# Patient Record
Sex: FEMALE | Race: ASIAN | URBAN - METROPOLITAN AREA
[De-identification: names, ages, dates, MRNs, and addresses within clinical notes are randomized per-mention and may not be internally consistent; named-entity substitution may affect disease eponyms.]

---

## 2017-01-11 ENCOUNTER — OFFICE VISIT (OUTPATIENT)
Dept: FAMILY MEDICINE CLINIC | Age: 22
End: 2017-01-11

## 2017-01-11 VITALS
HEART RATE: 91 BPM | OXYGEN SATURATION: 97 % | TEMPERATURE: 98.6 F | WEIGHT: 135.8 LBS | SYSTOLIC BLOOD PRESSURE: 102 MMHG | RESPIRATION RATE: 16 BRPM | DIASTOLIC BLOOD PRESSURE: 60 MMHG | HEIGHT: 63 IN | BODY MASS INDEX: 24.06 KG/M2

## 2017-01-11 DIAGNOSIS — R51.9 HEADACHE DISORDER: Primary | ICD-10-CM

## 2017-01-11 RX ORDER — BUTALBITAL, ACETAMINOPHEN AND CAFFEINE 50; 325; 40 MG/1; MG/1; MG/1
1 TABLET ORAL
Qty: 30 TAB | Refills: 1 | Status: SHIPPED | OUTPATIENT
Start: 2017-01-11 | End: 2021-04-23 | Stop reason: ALTCHOICE

## 2017-01-11 NOTE — PROGRESS NOTES
HISTORY OF PRESENT ILLNESS  Michaelle Closs is a 25 y.o. female. HPI   c/o intermittent HA's ; present X 1.5 years; helped by excedrin;  worsened by nothing;  Ha's are global in location; some associated nausea; No fever ; Has pain with movement of the head; may have some photosensitivity; some phonophobia; pain may be an 8/10 at its worse. Aleve may  help. Eye exam is UTD. 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 She has a Ha at least once a week;     Rib pain resolved; this was an original reason pt called for an appointment. PMH,  Meds, Allergies, Family History, Social history reviewed      Review of Systems   Constitutional: Negative for chills and fever. Cardiovascular: Negative for chest pain, palpitations and leg swelling. Physical Exam   Constitutional: She is oriented to person, place, and time. She appears well-developed and well-nourished. No distress. Cardiovascular: Normal rate and regular rhythm. Exam reveals no gallop and no friction rub. No murmur heard. Pulmonary/Chest: Breath sounds normal. No respiratory distress. She has no wheezes. She has no rales. Neurological: She is alert and oriented to person, place, and time. No cranial nerve deficit. Nursing note and vitals reviewed. ASSESSMENT and PLAN    ICD-10-CM ICD-9-CM    1. Headache disorder R51 784.0 butalbital-acetaminophen-caffeine (FIORICET, ESGIC) -40 mg per tablet     As above, new   treatment plan as listed below  Orders Placed This Encounter    butalbital-acetaminophen-caffeine (FIORICET, ESGIC) -40 mg per tablet     Will monitor,  Alarm signs for worsening sx reviewed  Follow-up Disposition:  Return in about 2 months (around 3/11/2017) for HA. An After Visit Summary was printed and given to the patient. This has been fully explained to the patient, who indicates understanding.

## 2017-01-11 NOTE — PATIENT INSTRUCTIONS

## 2017-01-11 NOTE — MR AVS SNAPSHOT
Visit Information Date & Time Provider Department Dept. Phone Encounter #  
 1/11/2017 11:00 AM [facility] [phone] [id number] Follow-up Instructions Return in about 2 months (around 3/11/2017) for HA. Upcoming Health Maintenance Date Due Hepatitis A Peds Age 1-18 (1 of 2 - Standard Series) 2/3/1998 HPV AGE 9Y-26Y (1 of 3 - Female 3 Dose Series) 2/3/2008 INFLUENZA AGE 9 TO ADULT 8/1/2016 DTaP/Tdap/Td series (7 - Td) 3/25/2018 Allergies as of 1/11/2017  Review Complete On: 1/11/2017 By: Amber Walton LPN No Known Allergies Current Immunizations  Reviewed on 7/20/2015 Name Date DTaP 4/19/2001, 2/2/1998, 1997, 1997, 1997 Hep B Vaccine 1997, 1997, 1997 Hep B Vaccine (Adult) 7/17/2015 Hib 11/13/1998, 2/2/1998, 1997, 1997, 1997 MMR 4/19/2001, 2/9/1998 Meningococcal (MCV4P) Vaccine 6/26/2015 Poliovirus vaccine 2/2/1998, 1997, 1997 TB Skin Test (PPD) Intradermal 7/29/2016 11:15 AM, 7/17/2015  3:22 PM, 7/8/2015 11:41 AM, 6/26/2015 10:55 AM, 9/29/2014 Tdap 3/25/2008 Varicella Virus Vaccine 7/17/2015, 3/30/2009 Not reviewed this visit You Were Diagnosed With   
  
 Codes Comments Headache disorder    -  Primary ICD-10-CM: R46 ICD-9-CM: 029. 0 Vitals BP Pulse Temp Resp Height(growth percentile) Weight(growth percentile) 102/60 (30 %/ 43 %)* (BP 1 Location: Left arm, BP Patient Position: Sitting) 91 98.6 °F (37 °C) (Oral) 16 5' 3\" (1.6 m) (30 %, Z= -0.51) 135 lb 12.8 oz (61.6 kg) (63 %, Z= 0.33) LMP SpO2 BMI OB Status Smoking Status 01/01/2017 (Exact Date) 97% 24.06 kg/m2 (73 %, Z= 0.60) Having regular periods Never Smoker *BP percentiles are based on NHBPEP's 4th Report Growth percentiles are based on CDC 2-20 Years data. BMI and BSA Data Body Mass Index Body Surface Area 24.06 kg/m 2 1.65 m 2 Preferred Pharmacy Pharmacy Name Phone [address], [address] [facility] [phone] Your Updated Medication List  
  
   
This list is accurate as of: 1/11/17 11:51 AM.  Always use your most recent med list.  
  
  
  
  
 butalbital-acetaminophen-caffeine -40 mg per tablet Commonly known as:  Violet Rosalba Take 1 Tab by mouth every six (6) hours as needed for Pain. Max Daily Amount: 4 Tabs. fluticasone 50 mcg/actuation nasal spray Commonly known as:  FLONASE  
1 [place] by Both Nostrils route two (2) times a day. Prescriptions Printed Refills  
 butalbital-acetaminophen-caffeine (FIORICET, ESGIC) -40 mg per tablet 1 Sig: Take 1 Tab by mouth every six (6) hours as needed for Pain. Max Daily Amount: 4 Tabs. Class: Print Route: Oral  
  
Follow-up Instructions Return in about 2 months (around 3/11/2017) for HA. Patient Instructions Headache: Care Instructions Your Care Instructions Headaches have many possible causes. Most headaches aren't a sign of a more serious problem, and they will get better on their own. Home treatment may help you feel better faster. The doctor has checked you carefully, but problems can develop later. If you notice any problems or new symptoms, get medical treatment right away. Follow-up care is a key part of your treatment and safety. Be sure to make and go to all appointments, and call your doctor if you are having problems. It's also a good idea to know your test results and keep a list of the medicines you take. How can you care for yourself at home? · Do not drive if you have taken a prescription pain medicine. · Rest in a quiet, dark room until your headache is gone. Close your eyes and try to relax or go to sleep. Don't watch TV or read. · Put a cold, moist cloth or cold pack on the painful area for 10 to 20 minutes at a time. Put a thin cloth between the cold pack and your skin. · Use a warm, moist towel or a heating pad set on low to relax tight shoulder and neck muscles. · Have someone gently massage your neck and shoulders. · Take pain medicines exactly as directed. ¨ If the doctor gave you a prescription medicine for pain, take it as prescribed. ¨ If you are not taking a prescription pain medicine, ask your doctor if you can take an over-the-counter medicine. · Be careful not to take pain medicine more often than the instructions allow, because you may get worse or more frequent headaches when the medicine wears off. · Do not ignore new symptoms that occur with a headache, such as a fever, weakness or numbness, vision changes, or confusion. These may be signs of a more serious problem. To prevent headaches · Keep a headache diary so you can figure out what triggers your headaches. Avoiding triggers may help you prevent headaches. Record when each headache began, how long it lasted, and what the pain was like (throbbing, aching, stabbing, or dull). Write down any other symptoms you had with the headache, such as nausea, flashing lights or dark spots, or sensitivity to bright light or loud noise. Note if the headache occurred near your period. List anything that might have triggered the headache, such as certain foods (chocolate, cheese, wine) or odors, smoke, bright light, stress, or lack of sleep. · Find healthy ways to deal with stress. Headaches are most common during or right after stressful times. Take time to relax before and after you do something that has caused a headache in the past. 
· Try to keep your muscles relaxed by keeping good posture. Check your jaw, face, neck, and shoulder muscles for tension, and try relaxing them. When sitting at a desk, change positions often, and stretch for 30 seconds each hour. · Get plenty of sleep and exercise. · Eat regularly and well. Long periods without food can trigger a headache. · Treat yourself to a massage. Some people find that regular massages are very helpful in relieving tension. · Limit caffeine by not drinking too much coffee, tea, or soda. But don't quit caffeine suddenly, because that can also give you headaches. · Reduce eyestrain from computers by blinking frequently and looking away from the computer screen every so often. Make sure you have proper eyewear and that your monitor is set up properly, about an arm's length away. · Seek help if you have depression or anxiety. Your headaches may be linked to these conditions. Treatment can both prevent headaches and help with symptoms of anxiety or depression. When should you call for help? Call 911 anytime you think you may need emergency care. For example, call if: 
· You have signs of a stroke. These may include: 
¨ Sudden numbness, paralysis, or weakness in your face, arm, or leg, especially on only one side of your body. ¨ Sudden vision changes. ¨ Sudden trouble speaking. ¨ Sudden confusion or trouble understanding simple statements. ¨ Sudden problems with walking or balance. ¨ A sudden, severe headache that is different from past headaches. Call your doctor now or seek immediate medical care if: 
· You have a new or worse headache. · Your headache gets much worse. Where can you learn more? Go to http://indra-mario.info/. Enter M271 in the search box to learn more about \"Headache: Care Instructions. \" Current as of: February 19, 2016 Content Version: 11.1 © 4602-1001 Mom-stop.com. Care instructions adapted under license by [employer] (which disclaims liability or warranty for this information).  If you have questions about a medical condition or this instruction, always ask your healthcare professional. Keily Nagel[employer] disclaims any warranty or liability for your use of this information. Introducing [facility]! [facility] introduces "[employer] patient portal. Now you can access parts of your medical record, email your doctor's office, and request medication refills online. 1. In your internet browser, go to [email or web address] 2. Click on the First Time User? Click Here link in the Sign In box. You will see the New Member Sign Up page. 3. Enter your "[employer] Access Code exactly as it appears below. You will not need to use this code after youve completed the sign-up process. If you do not sign up before the expiration date, you must request a new code. · "[employer] Access Code: [email or web address] Expires: 4/11/2017 11:51 AM 
 
4. Enter the last four digits of your Social Security Number (xxxx) and Date of Birth (mm/dd/yyyy) as indicated and click Submit. You will be taken to the next sign-up page. 5. Create a "[employer] ID. This will be your "[employer] login ID and cannot be changed, so think of one that is secure and easy to remember. 6. Create a "[employer] password. You can change your password at any time. 7. Enter your Password Reset Question and Answer. This can be used at a later time if you forget your password. 8. Enter your e-mail address. You will receive e-mail notification when new information is available in [address] 9. Click Sign Up. You can now view and download portions of your medical record. 10. Click the Download Summary menu link to download a portable copy of your medical information. If you have questions, please visit the Frequently Asked Questions section of the "[employer] website. Remember, "[employer] is NOT to be used for urgent needs. For medical emergencies, dial 911. Now available from your iPhone and Android! Please provide this summary of care documentation to your next provider. Your primary care clinician is listed as Mata Contreras. If you have any questions after today's visit, please call [phone].

## 2017-01-11 NOTE — PROGRESS NOTES
1. Have you been to the ER, urgent care clinic since your last visit? Hospitalized since your last visit? NO    2. Have you seen or consulted any other health care providers outside of the [address] [place] [address] since your last visit? Include any pap smears or colon screening. NO    3. Would you like to have a Flu Shot Today?  NO already had one

## 2017-06-08 ENCOUNTER — OFFICE VISIT (OUTPATIENT)
Dept: FAMILY MEDICINE CLINIC | Age: 22
End: 2017-06-08

## 2017-06-08 VITALS
TEMPERATURE: 98.6 F | HEART RATE: 76 BPM | RESPIRATION RATE: 16 BRPM | WEIGHT: 128.6 LBS | HEIGHT: 63 IN | DIASTOLIC BLOOD PRESSURE: 70 MMHG | SYSTOLIC BLOOD PRESSURE: 100 MMHG | BODY MASS INDEX: 22.79 KG/M2 | OXYGEN SATURATION: 97 %

## 2017-06-08 NOTE — PROGRESS NOTES
Patient reports that she cannot void. Wishes to return in the morning for early appointment.    Abida SRUESH

## 2017-06-08 NOTE — PROGRESS NOTES
1. Have you been to the ER, urgent care clinic since your last visit? Hospitalized since your last visit? No    2. Have you seen or consulted any other health care providers outside of the [facility] since your last visit? Include any pap smears or colon screening.  No

## 2017-06-09 ENCOUNTER — OFFICE VISIT (OUTPATIENT)
Dept: FAMILY MEDICINE CLINIC | Age: 22
End: 2017-06-09

## 2017-06-09 VITALS
TEMPERATURE: 98.7 F | SYSTOLIC BLOOD PRESSURE: 88 MMHG | OXYGEN SATURATION: 97 % | DIASTOLIC BLOOD PRESSURE: 64 MMHG | WEIGHT: 128 LBS | BODY MASS INDEX: 22.68 KG/M2 | HEIGHT: 63 IN | HEART RATE: 102 BPM | RESPIRATION RATE: 16 BRPM

## 2017-06-09 DIAGNOSIS — N92.6 IRREGULAR MENSES: Primary | ICD-10-CM

## 2017-06-09 DIAGNOSIS — I95.0 IDIOPATHIC HYPOTENSION: ICD-10-CM

## 2017-06-09 LAB
HCG URINE, QL. (POC): NEGATIVE
VALID INTERNAL CONTROL?: YES

## 2017-06-09 RX ORDER — NORGESTIMATE AND ETHINYL ESTRADIOL 7DAYSX3 LO
1 KIT ORAL DAILY
Qty: 3 PACKAGE | Refills: 4 | Status: SHIPPED | OUTPATIENT
Start: 2017-06-09 | End: 2018-07-01 | Stop reason: SDUPTHER

## 2017-06-09 NOTE — PROGRESS NOTES
HISTORY OF PRESENT ILLNESS  Ewelina Nguyen is a 23 y.o. female. Pt presents with a history of irregular periods and headaches. She is not sure her headaches are associated with her cycles. Her cycles last about 6 days. Irregular Menses   The history is provided by the patient. Pertinent negatives include no chest pain, no headaches and no shortness of breath. Review of Systems   Constitutional: Negative for chills and fever. Eyes: Negative. Respiratory: Negative. Negative for shortness of breath. Cardiovascular: Negative. Negative for chest pain. Neurological: Negative for headaches. Physical Exam   Constitutional: She appears well-developed and well-nourished. No distress. Neck: Neck supple. Cardiovascular: Normal rate, regular rhythm and normal heart sounds. No murmur heard. Pulmonary/Chest: Effort normal and breath sounds normal. No respiratory distress. She has no wheezes. She has no rales. ASSESSMENT and PLAN    ICD-10-CM ICD-9-CM    1. Irregular menses N92.6 626.4 AMB POC URINE PREGNANCY TEST, VISUAL COLOR COMPARISON      norgestimate-ethinyl estradiol (ORTHO TRI-CYCLEN LO, 28,) 0.18/0.215/0.25 mg-25 mcg tab   2. Idiopathic hypotension I95.0 458.9        PLAN:  We discussed her headaches. She will try to track them. We discussed birth control, how to take it correctly and when to start her pack. Pt will call if she gets to the pharm and they cost too much, we can switch. Pt advised that next year she will be due for a PAP. Pt is to call with any concerns. We discussed her low blood pressure, symptoms and I advised her to drink plenty of fluids and salt intake.

## 2017-06-09 NOTE — MR AVS SNAPSHOT
Visit Information Date & Time Provider Department Dept. Phone Encounter #  
 6/9/2017  8:00 AM Rosamaria Meza NP [facility] [phone] [id number] Follow-up Instructions Return in about 1 year (around 6/9/2018). Upcoming Health Maintenance Date Due Hepatitis A Peds Age 1-18 (1 of 2 - Standard Series) 2/3/1998 HPV AGE 9Y-26Y (1 of 3 - Female 3 Dose Series) 2/3/2008 INFLUENZA AGE 9 TO ADULT 8/1/2017 DTaP/Tdap/Td series (7 - Td) 3/25/2018 Allergies as of 6/9/2017  Review Complete On: 6/9/2017 By: Rosamaria Meza NP No Known Allergies Current Immunizations  Reviewed on 7/20/2015 Name Date DTaP 4/19/2001, 2/2/1998, 1997, 1997, 1997 Hep B Vaccine 1997, 1997, 1997 Hep B Vaccine (Adult) 7/17/2015 Hib 11/13/1998, 2/2/1998, 1997, 1997, 1997 MMR 4/19/2001, 2/9/1998 Meningococcal (MCV4P) Vaccine 6/26/2015 Poliovirus vaccine 2/2/1998, 1997, 1997 TB Skin Test (PPD) Intradermal 7/29/2016 11:15 AM, 7/17/2015  3:22 PM, 7/8/2015 11:41 AM, 6/26/2015 10:55 AM, 9/29/2014 Tdap 3/25/2008 Varicella Virus Vaccine 7/17/2015, 3/30/2009 Not reviewed this visit You Were Diagnosed With   
  
 Codes Comments Irregular menses    -  Primary ICD-10-CM: N92.6 ICD-9-CM: 626.4 Vitals BP Pulse Temp Resp Height(growth percentile) Weight(growth percentile) (!) 88/64 (BP 1 Location: Left arm, BP Patient Position: Sitting) (!) 102 98.7 °F (37.1 °C) (Oral) 16 5' 3\" (1.6 m) 128 lb (58.1 kg) LMP SpO2 BMI OB Status Smoking Status 06/09/2017 97% 22.67 kg/m2 Unknown Never Smoker BMI and BSA Data Body Mass Index Body Surface Area  
 22.67 kg/m 2 1.61 m 2 Preferred Pharmacy Pharmacy Name Phone [address], Igor Naidu [address] [place] [address] 10[address] [facility] [phone] Your Updated Medication List  
  
   
 This list is accurate as of: 6/9/17  8:27 AM.  Always use your most recent med list.  
  
  
  
  
 butalbital-acetaminophen-caffeine -40 mg per tablet Commonly known as:  Gonsalo Donohue Take 1 Tab by mouth every six (6) hours as needed for Pain. Max Daily Amount: 4 Tabs. fluticasone 50 mcg/actuation nasal spray Commonly known as:  FLONASE  
1 [place] by Both Nostrils route two (2) times a day. norgestimate-ethinyl estradiol 0.18/0.215/0.25 mg-25 mcg Tab Commonly known as:  ORTHO TRI-CYCLEN LO (28) Take 1 Tab by mouth daily. Prescriptions Sent to Pharmacy Refills  
 norgestimate-ethinyl estradiol (ORTHO TRI-CYCLEN LO, 28,) 0.18/0.215/0.25 mg-25 mcg tab 4 Sig: Take 1 Tab by mouth daily. Class: Normal  
 Pharmacy: [address] [place] [address] #: [phone] Route: Oral  
  
We Performed the Following AMB POC URINE PREGNANCY TEST, VISUAL COLOR COMPARISON [65421 CPT(R)] Follow-up Instructions Return in about 1 year (around 6/9/2018). Patient Instructions Learning About Birth Control: Combination Pills What are combination pills? Combination pills are used to prevent pregnancy. Most people call them \"the pill. \" Combination pills release a regular dose of two hormones, estrogen and progestin. They prevent pregnancy in a few ways. They thicken the mucus in the cervix. This makes it hard for sperm to travel into the uterus. And they thin the lining of the uterus. This makes it harder for a fertilized egg to attach to the uterus. The hormones also can stop the ovaries from releasing an egg each month (ovulation). You have to take a pill every day to prevent pregnancy. The packages for these pills are different. The most common one has 3 weeks of hormone pills and 1 week of sugar pills.  The sugar pills don't contain any hormones. You have your period on that week. But other packs have no sugar pills. If you take hormone pills for the whole month, you will not get your period as often. Or you may not get it at all. How well do they work? In the first year of use: · When combination pills are taken exactly as directed, fewer than 1 woman out of 100 has an unplanned pregnancy. · When pills are not taken exactly as directed, such as forgetting to take them sometimes, 9 women out of 100 have an unplanned pregnancy. Be sure to tell your doctor about any health problems you have or medicines you take. He or she can help you choose the birth control method that is right for you. What are the advantages of combination pills? · These pills work better than barrier methods. Barrier methods include condoms and diaphragms. · They may reduce acne and heavy bleeding. They may also reduce cramping and other symptoms of PMS (premenstrual syndrome). · The pills let you control your periods. You can have periods every month or every few months. Or you can choose not to have them at all. · You don't have to interrupt sex to use the pills. What are the disadvantages of combination pills? · You have to take a pill at the same time every day to prevent pregnancy. · Combination pills don't protect against sexually transmitted infections (STIs), such as herpes or HIV/AIDS. If you aren't sure if your sex partner might have an STI, use a condom to protect against disease. · They may cause changes in your period. You may have little bleeding, skipped periods, or spotting. · They may cause mood changes, less interest in sex, or weight gain. · Combination pills contain estrogen. They may not be right for you if you have certain health problems. Where can you learn more? Go to http://indra-mario.info/. Enter P146 in the search box to learn more about \"Learning About Birth Control: Combination Pills. \" 
 Current as of: May 30, 2016 Content Version: 11.2 © 5976-7789 [employer], [employer]. Care instructions adapted under license by [employer] (which disclaims liability or warranty for this information). If you have questions about a medical condition or this instruction, always ask your healthcare professional. [address] any warranty or liability for your use of this information. Introducing [facility]! Thi Pedraza introduces The [employer] patient portal. Now you can access parts of your medical record, email your doctor's office, and request medication refills online. 1. In your internet browser, go to [email or web address] [email or web address] 2. Click on the First Time User? Click Here link in the Sign In box. You will see the New Member Sign Up page. 3. Enter your The Start Project Access Code exactly as it appears below. You will not need to use this code after youve completed the sign-up process. If you do not sign up before the expiration date, you must request a new code. · [employer] Access Code: [email or web address] Expires: 9/6/2017  9:03 AM 
 
4. Enter the last four digits of your Social Security Number (xxxx) and Date of Birth (mm/dd/yyyy) as indicated and click Submit. You will be taken to the next sign-up page. 5. Create a The [employer] Project ID. This will be your [employer] login ID and cannot be changed, so think of one that is secure and easy to remember. 6. Create a [employer] password. You can change your password at any time. 7. Enter your Password Reset Question and Answer. This can be used at a later time if you forget your password. 8. Enter your e-mail address. You will receive e-mail notification when new information is available in [address] 9. Click Sign Up. You can now view and download portions of your medical record. 10. Click the Download Summary menu link to download a portable copy of your medical information. If you have questions, please visit the Frequently Asked Questions section of the [employer] website. Remember, [employer] is NOT to be used for urgent needs. For medical emergencies, dial 911. Now available from your iPhone and Android! Please provide this summary of care documentation to your next provider. Your primary care clinician is listed as Nicole Ac. If you have any questions after today's visit, please call [phone].

## 2017-06-09 NOTE — PATIENT INSTRUCTIONS
Learning About Birth Control: Combination Pills  What are combination pills? Combination pills are used to prevent pregnancy. Most people call them \"the pill. \"  Combination pills release a regular dose of two hormones, estrogen and progestin. They prevent pregnancy in a few ways. They thicken the mucus in the cervix. This makes it hard for sperm to travel into the uterus. And they thin the lining of the uterus. This makes it harder for a fertilized egg to attach to the uterus. The hormones also can stop the ovaries from releasing an egg each month (ovulation). You have to take a pill every day to prevent pregnancy. The packages for these pills are different. The most common one has 3 weeks of hormone pills and 1 week of sugar pills. The sugar pills don't contain any hormones. You have your period on that week. But other packs have no sugar pills. If you take hormone pills for the whole month, you will not get your period as often. Or you may not get it at all. How well do they work? In the first year of use:  · When combination pills are taken exactly as directed, fewer than 1 woman out of 100 has an unplanned pregnancy. · When pills are not taken exactly as directed, such as forgetting to take them sometimes, 9 women out of 100 have an unplanned pregnancy. Be sure to tell your doctor about any health problems you have or medicines you take. He or she can help you choose the birth control method that is right for you. What are the advantages of combination pills? · These pills work better than barrier methods. Barrier methods include condoms and diaphragms. · They may reduce acne and heavy bleeding. They may also reduce cramping and other symptoms of PMS (premenstrual syndrome). · The pills let you control your periods. You can have periods every month or every few months. Or you can choose not to have them at all. · You don't have to interrupt sex to use the pills.   What are the disadvantages of combination pills? · You have to take a pill at the same time every day to prevent pregnancy. · Combination pills don't protect against sexually transmitted infections (STIs), such as herpes or HIV/AIDS. If you aren't sure if your sex partner might have an STI, use a condom to protect against disease. · They may cause changes in your period. You may have little bleeding, skipped periods, or spotting. · They may cause mood changes, less interest in sex, or weight gain. · Combination pills contain estrogen. They may not be right for you if you have certain health problems. Where can you learn more? Go to http://indra-mario.info/. Enter X149 in the search box to learn more about \"Learning About Birth Control: Combination Pills. \"  Current as of: May 30, 2016  Content Version: 11.2  © 8593-6162 [employer]. Care instructions adapted under license by [employer] (which disclaims liability or warranty for this information). If you have questions about a medical condition or this instruction, always ask your healthcare professional. [address] any warranty or liability for your use of this information.

## 2017-06-09 NOTE — PROGRESS NOTES
1. Have you been to the ER, urgent care clinic since your last visit? Hospitalized since your last visit? No    2. Have you seen or consulted any other health care providers outside of the [facility] [place] since your last visit? Include any pap smears or colon screening.  No

## 2017-08-15 ENCOUNTER — CLINICAL SUPPORT (OUTPATIENT)
Dept: FAMILY MEDICINE CLINIC | Age: 22
End: 2017-08-15

## 2017-08-15 DIAGNOSIS — Z11.1 SCREENING EXAMINATION FOR PULMONARY TUBERCULOSIS: ICD-10-CM

## 2017-08-15 DIAGNOSIS — Z23 ENCOUNTER FOR IMMUNIZATION: ICD-10-CM

## 2017-08-15 RX ORDER — NORGESTIMATE AND ETHINYL ESTRADIOL 7DAYSX3 LO
KIT ORAL
COMMUNITY
End: 2017-10-20 | Stop reason: SDUPTHER

## 2017-08-15 NOTE — PROGRESS NOTES
PPD Placement note  Severino aKur, 23 y.o. 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 Yes  Is patient taking any oral or IV steroid medication now or have they taken it in the last month? no  Has the patient ever received the BCG vaccine?: no  Has the patient been in recent contact with anyone known or suspected of having active TB disease?: no       Date of exposure (if applicable): n/a       Name of person they were exposed to (if applicable): n/a  Patient's Country of origin?: [place]  O: Alert and oriented in NAD. P:  Patient presented to office for PPD placement. Reviewed allergies. Patient states has never had a positive PPD in the past. PPD injection at right forearm time of placement 2:32 PM.  Patient advised to return 48-72hrs for reading. Patient tolerated injection well and left ambulatory without any complaints. Patient verbalizes understanding.

## 2017-08-15 NOTE — PATIENT INSTRUCTIONS
Tuberculin Skin Test: Care Instructions  Your Care Instructions    Tuberculosis (TB) is a bacterial infection that can damage the lungs or other parts of the body. The TB skin test can tell if you have TB bacteria in your body. Many people are exposed to TB and test positive for TB bacteria in their bodies, but they don't get the disease. TB bacteria can stay in your body without making you sick. This is because your immune system can keep TB in check. Your doctor may want you to have a TB skin test if you have been in close contact with someone who has TB. Or you may need the test if you have symptoms that might be caused by TB, such as a cough that does not go away, a fever, or weight loss. You also may get the test if you are a health care worker. During the skin test, part of a TB bacterium is injected under your skin. The test will feel like a skin prick. If you have TB bacteria in your body, a firm red bump will form at the shot site within 2 days. If the test shows that you are infected with TB (positive), your doctor probably will order more tests. A TB-positive skin test can't tell when you became infected with TB. And it can't tell whether the infection can be passed to others. Follow-up care is a key part of your treatment and safety. Be sure to make and go to all appointments, and call your doctor if you are having problems. It's also a good idea to know your test results and keep a list of the medicines you take. How can you care for yourself at home? · Do not scratch the test site. Scratching it may cause redness or swelling. This could affect the test results. · To ease itching, put a cold washcloth on the site. Then pat the site dry. · Do not cover the test site with a bandage or other dressing. · Go back to your doctor's office or hospital to have the test read on the follow-up date. This must be done between 48 and 72 hours after you get the shot. When should you call for help?   Watch closely for changes in your health, and be sure to contact your doctor if:  · You did not have your TB skin test checked by your doctor. · You have a fever or swelling in your arm. · You do not get better as expected. Where can you learn more? Go to http://indra-mario.info/. Enter [phone] in the search box to learn more about \"Tuberculin Skin Test: Care Instructions. \"  Current as of: March 3, 2017  Content Version: 11.3  © 4229-8852 [employer]. Care instructions adapted under license by [employer] (which disclaims liability or warranty for this information). If you have questions about a medical condition or this instruction, always ask your healthcare professional. [address] any warranty or liability for your use of this information. placement 2:32 PM.  Patient advised to return 48-72hrs for reading. Patient must have PPD read no earlier than 2:32 PM on Thursday 08/17/2017 and no later than 2:32 PM on Friday 08/18/2017.

## 2017-08-15 NOTE — MR AVS SNAPSHOT
Visit Information Date & Time Provider Department Dept. Phone Encounter #  
 8/15/2017  2:05 PM Erica Lira  [facility], [place], operated [facility] [phone] Upcoming Health Maintenance Date Due Hepatitis A Peds Age 1-18 (1 of 2 - Standard Series) 2/3/1998 HPV AGE 9Y-26Y (1 of 3 - Female 3 Dose Series) 2/3/2008 INFLUENZA AGE 9 TO ADULT 8/1/2017 DTaP/Tdap/Td series (7 - Td) 3/25/2018 Allergies as of 8/15/2017  Review Complete On: 8/15/2017 By: Bj Spencer LPN No Known Allergies Current Immunizations  Reviewed on 7/20/2015 Name Date DTaP 4/19/2001, 2/2/1998, 1997, 1997, 1997 Hep B Vaccine 1997, 1997, 1997 Hep B Vaccine (Adult) 7/17/2015 Hib 11/13/1998, 2/2/1998, 1997, 1997, 1997 MMR 4/19/2001, 2/9/1998 Meningococcal (MCV4P) Vaccine 6/26/2015 Poliovirus vaccine 2/2/1998, 1997, 1997 TB Skin Test (PPD) Intradermal  Incomplete, 7/29/2016 11:15 AM, 7/17/2015  3:22 PM, 7/8/2015 11:41 AM, 6/26/2015 10:55 AM, 9/29/2014 Tdap 3/25/2008 Varicella Virus Vaccine 7/17/2015, 3/30/2009 Not reviewed this visit You Were Diagnosed With   
  
 Codes Comments Encounter for immunization     ICD-10-CM: Y81 ICD-9-CM: V03.89 Screening examination for pulmonary tuberculosis     ICD-10-CM: Z11.1 ICD-9-CM: V74.1 Vitals OB Status Smoking Status Unknown Never Smoker Preferred Pharmacy Pharmacy Name Phone [address], [address] [facility] [address] [facility] [phone] Your Updated Medication List  
  
   
This list is accurate as of: 8/15/17  2:34 PM.  Always use your most recent med list.  
  
  
  
  
 butalbital-acetaminophen-caffeine -40 mg per tablet Commonly known as:  Elizabeth Rolon Take 1 Tab by mouth every six (6) hours as needed for Pain. Max Daily Amount: 4 Tabs. fluticasone 50 mcg/actuation nasal spray Commonly known as:  FLONASE  
1 [place]ond by Both Nostrils route two (2) times a day. * TRINESSA LO 0.18/0.215/0.25 mg-25 mcg Tab Generic drug:  norgestimate-ethinyl estradiol Take  by mouth. * norgestimate-ethinyl estradiol 0.18/0.215/0.25 mg-25 mcg Tab Commonly known as:  ORTHO TRI-CYCLEN LO (28) Take 1 Tab by mouth daily. * Notice: This list has 2 medication(s) that are the same as other medications prescribed for you. Read the directions carefully, and ask your doctor or other care provider to review them with you. We Performed the Following AMB POC TUBERCULOSIS, INTRADERMAL (SKIN TEST) [99764 CPT(R)] Patient Instructions Tuberculin Skin Test: Care Instructions Your Care Instructions Tuberculosis (TB) is a bacterial infection that can damage the lungs or other parts of the body. The TB skin test can tell if you have TB bacteria in your body. Many people are exposed to TB and test positive for TB bacteria in their bodies, but they don't get the disease. TB bacteria can stay in your body without making you sick. This is because your immune system can keep TB in check. Your doctor may want you to have a TB skin test if you have been in close contact with someone who has TB. Or you may need the test if you have symptoms that might be caused by TB, such as a cough that does not go away, a fever, or weight loss. You also may get the test if you are a health care worker. During the skin test, part of a TB bacterium is injected under your skin. The test will feel like a skin prick. If you have TB bacteria in your body, a firm red bump will form at the shot site within 2 days. If the test shows that you are infected with TB (positive), your doctor probably will order more tests. A TB-positive skin test can't tell when you became infected with TB. And it can't tell whether the infection can be passed to others. Follow-up care is a key part of your treatment and safety. Be sure to make and go to all appointments, and call your doctor if you are having problems. It's also a good idea to know your test results and keep a list of the medicines you take. How can you care for yourself at home? · Do not scratch the test site. Scratching it may cause redness or swelling. This could affect the test results. · To ease itching, put a cold washcloth on the site. Then pat the site dry. · Do not cover the test site with a bandage or other dressing. · Go back to your doctor's office or hospital to have the test read on the follow-up date. This must be done between 48 and 72 hours after you get the shot. When should you call for help? Watch closely for changes in your health, and be sure to contact your doctor if: 
· You did not have your TB skin test checked by your doctor. · You have a fever or swelling in your arm. · You do not get better as expected. Where can you learn more? Go to http://indra-mario.info/. Enter [phone] in the search box to learn more about \"Tuberculin Skin Test: Care Instructions. \" Current as of: March 3, 2017 Content Version: 11.3 © 7628-8473 [employer]. Care instructions adapted under license by [employer] (which disclaims liability or warranty for this information). If you have questions about a medical condition or this instruction, always ask your healthcare professional. [place] [address] any warranty or liability for your use of this information. placement 2:32 PM.  Patient advised to return 48-72hrs for reading. Patient must have PPD read no earlier than 2:32 PM on Thursday 08/17/2017 and no later than 2:32 PM on Friday 08/18/2017. Introducing [address]! [employer] introduces [employer] patient portal. Now you can access parts of your medical record, email your doctor's office, and request medication refills online. 1. In your internet browser, go to [email or web address] 2. Click on the First Time User? Click Here link in the Sign In box. You will see the New Member Sign Up page. 3. Enter your [employer] Access Code exactly as it appears below. You will not need to use this code after youve completed the sign-up process. If you do not sign up before the expiration date, you must request a new code. · [employer] Access Code: [email or web address] Expires: 9/6/2017  9:03 AM 
 
4. Enter the last four digits of your Social Security Number (xxxx) and Date of Birth (mm/dd/yyyy) as indicated and click Submit. You will be taken to the next sign-up page. 5. Create a Snapbridge Softwaret ID. This will be your [employer] login ID and cannot be changed, so think of one that is secure and easy to remember. 6. Create a [employer] password. You can change your password at any time. 7. Enter your Password Reset Question and Answer. This can be used at a later time if you forget your password. 8. Enter your e-mail address. You will receive e-mail notification when new information is available in [address] 9. Click Sign Up. You can now view and download portions of your medical record. 10. Click the Download Summary menu link to download a portable copy of your medical information. If you have questions, please visit the Frequently Asked Questions section of the [employer] website. Remember, [employer] is NOT to be used for urgent needs. For medical emergencies, dial 911. Now available from your iPhone and Android! Please provide this summary of care documentation to your next provider. Your primary care clinician is listed as Mik Kwong. If you have any questions after today's visit, please call [phone].

## 2017-08-17 ENCOUNTER — CLINICAL SUPPORT (OUTPATIENT)
Dept: FAMILY MEDICINE CLINIC | Age: 22
End: 2017-08-17

## 2017-08-17 DIAGNOSIS — Z11.1 PPD SCREENING TEST: Primary | ICD-10-CM

## 2017-08-17 LAB
MM INDURATION POC: 0 MM (ref 0–5)
PPD POC: NEGATIVE NEGATIVE

## 2017-08-17 NOTE — LETTER
8/17/2017 2:52 PM 
 
Ms. [address] [place] [address] To Whom This May Concern: Ms. Vinnie Ribera came into the office on 8/15/2017 for PPD placement. She returned today for reading. PPD read as negative 0 mm induration. If you have any questions or concerns, please have the patient contact the office. Sincerely, Tova Silverman LPN

## 2017-08-17 NOTE — PROGRESS NOTES
Patient returned to office today for PPD reading. PPD read as negative 0 mm. No adverse effects noted. Patient left ambulatory with no complaints of pain or distress noted at this time.     Results for orders placed or performed in visit on 08/15/17   AMB POC TUBERCULOSIS, INTRADERMAL (SKIN TEST)   Result Value Ref Range    PPD negative Negative    mm Induration 0 mm

## 2017-10-19 ENCOUNTER — OFFICE VISIT (OUTPATIENT)
Dept: FAMILY MEDICINE CLINIC | Age: 22
End: 2017-10-19

## 2017-10-19 VITALS
SYSTOLIC BLOOD PRESSURE: 96 MMHG | TEMPERATURE: 97.8 F | HEART RATE: 78 BPM | DIASTOLIC BLOOD PRESSURE: 67 MMHG | WEIGHT: 123 LBS | BODY MASS INDEX: 21.79 KG/M2 | HEIGHT: 63 IN | RESPIRATION RATE: 18 BRPM

## 2017-10-19 DIAGNOSIS — J30.9 ALLERGIC RHINITIS, UNSPECIFIED CHRONICITY, UNSPECIFIED SEASONALITY, UNSPECIFIED TRIGGER: Primary | ICD-10-CM

## 2017-10-19 DIAGNOSIS — Z23 ENCOUNTER FOR IMMUNIZATION: ICD-10-CM

## 2017-10-19 DIAGNOSIS — K62.5 BRIGHT RED RECTAL BLEEDING: ICD-10-CM

## 2017-10-19 RX ORDER — BENZOCAINE .13; .15; .5; 2 G/100G; G/100G; G/100G; G/100G
2 GEL ORAL
Qty: 1 BOTTLE | Refills: 2 | Status: SHIPPED | OUTPATIENT
Start: 2017-10-19 | End: 2017-12-19 | Stop reason: SDUPTHER

## 2017-10-19 NOTE — MR AVS SNAPSHOT
Visit Information Date & Time Provider Department Dept. Phone Encounter #  
 10/19/2017  3:20 PM Jamal Fernández, [address] [facility] [address] [address] [phone] [id number] Follow-up Instructions Return if symptoms worsen or fail to improve. Upcoming Health Maintenance Date Due Hepatitis A Peds Age 1-18 (1 of 2 - Standard Series) 2/3/1998 HPV AGE 9Y-26Y (1 of 3 - Female 3 Dose Series) 2/3/2008 INFLUENZA AGE 9 TO ADULT 8/1/2017 DTaP/Tdap/Td series (7 - Td) 3/25/2018 Allergies as of 10/19/2017  Review Complete On: 10/19/2017 By: Jamal Fernández NP No Known Allergies Current Immunizations  Reviewed on 7/20/2015 Name Date DTaP 4/19/2001, 2/2/1998, 1997, 1997, 1997 Hep B Vaccine 1997, 1997, 1997 Hep B Vaccine (Adult) 7/17/2015 Hib 11/13/1998, 2/2/1998, 1997, 1997, 1997 MMR 4/19/2001, 2/9/1998 Meningococcal (MCV4P) Vaccine 6/26/2015 Poliovirus vaccine 2/2/1998, 1997, 1997 TB Skin Test (PPD) Intradermal 8/15/2017  2:32 PM, 7/29/2016 11:15 AM, 7/17/2015  3:22 PM, 7/8/2015 11:41 AM, 6/26/2015 10:55 AM, 9/29/2014 Tdap 3/25/2008 Varicella Virus Vaccine 7/17/2015, 3/30/2009 Not reviewed this visit You Were Diagnosed With   
  
 Codes Comments Allergic rhinitis, unspecified chronicity, unspecified seasonality, unspecified trigger    -  Primary ICD-10-CM: J30.9 ICD-9-CM: 477.9 Encounter for immunization     ICD-10-CM: V41 ICD-9-CM: V03.89 Bright red rectal bleeding     ICD-10-CM: K62.5 ICD-9-CM: 569.3 Vitals BP Pulse Temp Resp Height(growth percentile) Weight(growth percentile) 96/67 78 97.8 °F (36.6 °C) (Oral) 18 5' 3\" (1.6 m) 123 lb (55.8 kg) BMI OB Status Smoking Status 21.79 kg/m2 Unknown Never Smoker Vitals History BMI and BSA Data Body Mass Index Body Surface Area 21.79 kg/m 2 1.57 m 2 Preferred Pharmacy Pharmacy Name Phone [address] [facility] [address] [facility] [phone] Your Updated Medication List  
  
   
This list is accurate as of: 10/19/17  3:51 PM.  Always use your most recent med list.  
  
  
  
  
 budesonide 32 mcg/actuation nasal spray Commonly known as:  RHINOCORT AQUA  
2 Sprays by Both Nostrils route daily as needed for Rhinitis or Allergies. butalbital-acetaminophen-caffeine -40 mg per tablet Commonly known as:  [place] Pardon Take 1 Tab by mouth every six (6) hours as needed for Pain. Max Daily Amount: 4 Tabs. * TRINESSA LO 0.18/0.215/0.25 mg-25 mcg Tab Generic drug:  norgestimate-ethinyl estradiol Take  by mouth. * norgestimate-ethinyl estradiol 0.18/0.215/0.25 mg-25 mcg Tab Commonly known as:  ORTHO TRI-CYCLEN LO (28) Take 1 Tab by mouth daily. * Notice: This list has 2 medication(s) that are the same as other medications prescribed for you. Read the directions carefully, and ask your doctor or other care provider to review them with you. Prescriptions Sent to Pharmacy Refills  
 budesonide (RHINOCORT AQUA) 32 mcg/actuation nasal spray 2 Si Sprays by Both Nostrils route daily as needed for Rhinitis or Allergies. Class: Normal  
 Pharmacy: [address] [place] [address] #: [phone] Route: Both Nostrils We Performed the Following AMB POC BLOOD OCCULT QUAL FECAL HEMGLBN 1-3 [id number] CPT(R)] Follow-up Instructions Return if symptoms worsen or fail to improve. Patient Instructions Allergies: Care Instructions Your Care Instructions Allergies occur when your body's defense system (immune system) overreacts to certain substances. The immune system treats a harmless substance as if it were a harmful germ or virus.  Many things can cause this overreaction, including pollens, medicine, food, dust, animal dander, and mold. Allergies can be mild or severe. Mild allergies can be managed with home treatment. But medicine may be needed to prevent problems. Managing your allergies is an important part of staying healthy. Your doctor may suggest that you have allergy testing to help find out what is causing your allergies. When you know what things trigger your symptoms, you can avoid them. This can prevent allergy symptoms and other health problems. For severe allergies that cause reactions that affect your whole body (anaphylactic reactions), your doctor may prescribe a shot of epinephrine to carry with you in case you have a severe reaction. Learn how to give yourself the shot and keep it with you at all times. Make sure it is not . Follow-up care is a key part of your treatment and safety. Be sure to make and go to all appointments, and call your doctor if you are having problems. It's also a good idea to know your test results and keep a list of the medicines you take. How can you care for yourself at home? · If you have been told by your doctor that dust or dust mites are causing your allergy, decrease the dust around your bed: 
[facility] sheets, pillowcases, and other bedding in hot water every week. ¨ Use dust-proof covers for pillows, duvets, and mattresses. Avoid plastic covers because they tear easily and do not \"breathe. \" Wash as instructed on the label. ¨ Do not use any blankets and pillows that you do not need. ¨ Use blankets that you can wash in your washing machine. ¨ Consider removing drapes and carpets, which attract and hold dust, from your bedroom. · If you are allergic to house dust and mites, do not use home humidifiers. Your doctor can suggest ways you can control dust and mites. · Look for signs of cockroaches. Cockroaches cause allergic reactions. Use cockroach baits to get rid of them. Then, clean your home well. Cockroaches like areas where grocery bags, newspapers, empty bottles, or cardboard boxes are stored. Do not keep these inside your home, and keep trash and food containers sealed. Seal off any spots where cockroaches might enter your home. · If you are allergic to mold, get rid of furniture, rugs, and drapes that smell musty. Check for mold in the bathroom. · If you are allergic to outdoor pollen or mold spores, use air-conditioning. Change or clean all filters every month. Keep windows closed. · If you are allergic to pollen, stay inside when pollen counts are high. Use a vacuum  with a HEPA filter or a double-thickness filter at least two times each week. · Stay inside when air pollution is bad. Avoid paint fumes, perfumes, and other strong odors. · Avoid conditions that make your allergies worse. Stay away from smoke. Do not smoke or let anyone else smoke in your house. Do not use fireplaces or wood-burning stoves. · If you are allergic to your pets, change the air filter in your furnace every month. Use high-efficiency filters. · If you are allergic to pet dander, keep pets outside or out of your bedroom. Old carpet and cloth furniture can hold a lot of animal dander. You may need to replace them. When should you call for help? Give an epinephrine shot if: 
· You think you are having a severe allergic reaction. · You have symptoms in more than one body area, such as mild nausea and an itchy mouth. After giving an epinephrine shot call 911, even if you feel better. Call 911 if: 
· You have symptoms of a severe allergic reaction. These may include: 
¨ Sudden raised, red areas (hives) all over your body. ¨ Swelling of the throat, mouth, lips, or tongue. ¨ Trouble breathing. ¨ Passing out (losing consciousness). Or you may feel very lightheaded or suddenly feel weak, confused, or restless. · You have been given an epinephrine shot, even if you feel better. Call your doctor now or seek immediate medical care if: 
· You have symptoms of an allergic reaction, such as: ¨ A rash or hives (raised, red areas on the skin). ¨ Itching. ¨ Swelling. ¨ Belly pain, nausea, or vomiting. Watch closely for changes in your health, and be sure to contact your doctor if: 
· You do not get better as expected. Where can you learn more? Go to http://indra-mario.info/. Enter Z443 in the search box to learn more about \"Allergies: Care Instructions. \" Current as of: April 3, 2017 Content Version: 11.3 © 5949-8155 [employer]. Care instructions adapted under license by [employer] (which disclaims liability or warranty for this information). If you have questions about a medical condition or this instruction, always ask your healthcare professional. [place] [address] any warranty or liability for your use of this information. Influenza (Flu) Vaccine (Inactivated or Recombinant): What You Need to Know Why get vaccinated? Influenza (\"flu\") is a contagious disease that spreads around the [facility] every winter, usually between October and May. Flu is caused by influenza viruses and is spread mainly by coughing, sneezing, and close contact. Anyone can get flu. Flu strikes suddenly and can last several days. Symptoms vary by age, but can include: · Fever/chills. · Sore throat. · Muscle aches. · Fatigue. · Cough. · Headache. · Runny or stuffy nose. Flu can also lead to pneumonia and blood infections, and cause diarrhea and seizures in children. If you have a medical condition, such as heart or lung disease, flu can make it worse. Flu is more dangerous for some people. Infants and young children, people 72years of age and older, pregnant women, and people with certain health conditions or a weakened immune system are at greatest risk.  
Each year thousands of people in the [facility] die from flu, and many more are hospitalized. Flu vaccine can: · Keep you from getting flu. · Make flu less severe if you do get it. · Keep you from spreading flu to your family and other people. Inactivated and recombinant flu vaccines A dose of flu vaccine is recommended every flu season. Children 6 months through 6years of age may need two doses during the same flu season. Everyone else needs only one dose each flu season. Some inactivated flu vaccines contain a very small amount of a mercury-based preservative called thimerosal. Studies have not shown thimerosal in vaccines to be harmful, but flu vaccines that do not contain thimerosal are available. There is no live flu virus in flu shots. They cannot cause the flu. There are many flu viruses, and they are always changing. Each year a new flu vaccine is made to protect against three or four viruses that are likely to cause disease in the upcoming flu season. But even when the vaccine doesn't exactly match these viruses, it may still provide some protection. Flu vaccine cannot prevent: · Flu that is caused by a virus not covered by the vaccine. · Illnesses that look like flu but are not. Some people should not get this vaccine Tell the person who is giving you the vaccine: · If you have any severe (life-threatening) allergies. If you ever had a life-threatening allergic reaction after a dose of flu vaccine, or have a severe allergy to any part of this vaccine, you may be advised not to get vaccinated. Most, but not all, types of flu vaccine contain a small amount of egg protein. · If you ever had Guillain-Barré syndrome (also called GBS) Some people with a history of GBS should not get this vaccine. This should be discussed with your doctor. · If you are not feeling well. It is usually okay to get flu vaccine when you have a mild illness, but you might be asked to come back when you feel better. Risks of a vaccine reaction With any medicine, including vaccines, there is a chance of reactions. These are usually mild and go away on their own, but serious reactions are also possible. Most people who get a flu shot do not have any problems with it. Minor problems following a flu shot include: · Soreness, redness, or swelling where the shot was given · Hoarseness · Sore, red or itchy eyes · Cough · Fever · Aches · Headache · Itching · Fatigue If these problems occur, they usually begin soon after the shot and last 1 or 2 days. More serious problems following a flu shot can include the following: · There may be a small increased risk of Guillain-Barré Syndrome (GBS) after inactivated flu vaccine. This risk has been estimated at 1 or 2 additional cases per million people vaccinated. This is much lower than the risk of severe complications from flu, which can be prevented by flu vaccine. · Sophie Nichols children who get the flu shot along with pneumococcal vaccine (PCV13) and/or DTaP vaccine at the same time might be slightly more likely to have a seizure caused by fever. Ask your doctor for more information. Tell your doctor if a child who is getting flu vaccine has ever had a seizure Problems that could happen after any injected vaccine: · People sometimes faint after a medical procedure, including vaccination. Sitting or lying down for about 15 minutes can help prevent fainting, and injuries caused by a fall. Tell your doctor if you feel dizzy, or have vision changes or ringing in the ears. · Some people get severe pain in the shoulder and have difficulty moving the arm where a shot was given. This happens very rarely. · Any medication can cause a severe allergic reaction. Such reactions from a vaccine are very rare, estimated at about 1 in a million doses, and would happen within a few minutes to a few hours after the vaccination.  
As with any medicine, there is a very remote chance of a vaccine causing a serious injury or death. The safety of vaccines is always being monitored. For more information, visit: www.cdc.gov/vaccinesafety/. What if there is a serious reaction? What should I look for? · Look for anything that concerns you, such as signs of a severe allergic reaction, very high fever, or unusual behavior. Signs of a severe allergic reaction can include hives, swelling of the face and throat, difficulty breathing, a fast heartbeat, dizziness, and weakness  usually within a few minutes to a few hours after the vaccination. What should I do? · If you think it is a severe allergic reaction or other emergency that can't wait, call 9-1-1 and get the person to the nearest hospital. Otherwise, call your doctor. · Reactions should be reported to the \"Vaccine Adverse Event Reporting System\" (VAERS). Your doctor should file this report, or you can do it yourself through the VAERS website at www.vaers. [facility].gov, or by calling 8-257.870.9026. [employer] does not give medical advice. The National Vaccine Injury Compensation Program 
The National Vaccine Injury Compensation Program (VICP) is a federal program that was created to compensate people who may have been injured by certain vaccines. Persons who believe they may have been injured by a vaccine can learn about the program and about filing a claim by calling 1-947.546.4789 or visiting the [employer] website at www.[facility].gov/vaccinecompensation. There is a time limit to file a claim for compensation. How can I learn more? · Ask your healthcare provider. He or she can give you the vaccine package insert or suggest other sources of information. · Call your local or state health department. · Contact the Centers for Disease Control and Prevention (CDC): 
¨ Call 8-459.260.9973 (1-800-CDC-INFO) or ¨ Visit CDC's website at www.cdc.gov/flu Vaccine Information Statement Inactivated Influenza Vaccine 8/7/2015) 42 DILEEP Pugh 470QV-52 Department of Health and [employer] Centers for Disease Control and Prevention Many Vaccine Information Statements are available in [place] and other languages. See www.immunize.org/vis. Muchas hojas de información sobre vacunas están disponibles en español y en otros idiomas. Visite www.immunize.org/vis. Care instructions adapted under license by [employer] (which disclaims liability or warranty for this information). If you have questions about a medical condition or this instruction, always ask your healthcare professional. [address] any warranty or liability for your use of this information. Introducing [facility]! Isabella Mercado introduces Quintiq patient portal. Now you can access parts of your medical record, email your doctor's office, and request medication refills online. 1. In your internet browser, go to [email or web address]. [email or web address] game platform 2. Click on the First Time User? Click Here link in the Sign In box. You will see the New Member Sign Up page. 3. Enter your Quintiq Access Code exactly as it appears below. You will not need to use this code after youve completed the sign-up process. If you do not sign up before the expiration date, you must request a new code. · Quintiq Access Code: [email or web address] Expires: 1/17/2018  3:50 PM 
 
4. Enter the last four digits of your Social Security Number (xxxx) and Date of Birth (mm/dd/yyyy) as indicated and click Submit. You will be taken to the next sign-up page. 5. Create a Quintiq ID. This will be your Quintiq login ID and cannot be changed, so think of one that is secure and easy to remember. 6. Create a Quintiq password. You can change your password at any time. 7. Enter your Password Reset Question and Answer. This can be used at a later time if you forget your password. 8. Enter your e-mail address. You will receive e-mail notification when new information is available in [address] 9. Click Sign Up. You can now view and download portions of your medical record. 10. Click the Download Summary menu link to download a portable copy of your medical information. If you have questions, please visit the Frequently Asked Questions section of the [employer] website. Remember, [employer] is NOT to be used for urgent needs. For medical emergencies, dial 911. Now available from your iPhone and Android! Please provide this summary of care documentation to your next provider. If you have any questions after today's visit, please call [phone].

## 2017-10-19 NOTE — PATIENT INSTRUCTIONS
Allergies: Care Instructions  Your Care Instructions  Allergies occur when your body's defense system (immune system) overreacts to certain substances. The immune system treats a harmless substance as if it were a harmful germ or virus. Many things can cause this overreaction, including pollens, medicine, food, dust, animal dander, and mold. Allergies can be mild or severe. Mild allergies can be managed with home treatment. But medicine may be needed to prevent problems. Managing your allergies is an important part of staying healthy. Your doctor may suggest that you have allergy testing to help find out what is causing your allergies. When you know what things trigger your symptoms, you can avoid them. This can prevent allergy symptoms and other health problems. For severe allergies that cause reactions that affect your whole body (anaphylactic reactions), your doctor may prescribe a shot of epinephrine to carry with you in case you have a severe reaction. Learn how to give yourself the shot and keep it with you at all times. Make sure it is not . Follow-up care is a key part of your treatment and safety. Be sure to make and go to all appointments, and call your doctor if you are having problems. It's also a good idea to know your test results and keep a list of the medicines you take. How can you care for yourself at home? · If you have been told by your doctor that dust or dust mites are causing your allergy, decrease the dust around your bed:  [facility] sheets, pillowcases, and other bedding in hot water every week. ¨ Use dust-proof covers for pillows, duvets, and mattresses. Avoid plastic covers because they tear easily and do not \"breathe. \" Wash as instructed on the label. ¨ Do not use any blankets and pillows that you do not need. ¨ Use blankets that you can wash in your washing machine. ¨ Consider removing drapes and carpets, which attract and hold dust, from your bedroom.   · If you are allergic to house dust and mites, do not use home humidifiers. Your doctor can suggest ways you can control dust and mites. · Look for signs of cockroaches. Cockroaches cause allergic reactions. Use cockroach baits to get rid of them. Then, clean your home well. Cockroaches like areas where grocery bags, newspapers, empty bottles, or cardboard boxes are stored. Do not keep these inside your home, and keep trash and food containers sealed. Seal off any spots where cockroaches might enter your home. · If you are allergic to mold, get rid of furniture, rugs, and drapes that smell musty. Check for mold in the bathroom. · If you are allergic to outdoor pollen or mold spores, use air-conditioning. Change or clean all filters every month. Keep windows closed. · If you are allergic to pollen, stay inside when pollen counts are high. Use a vacuum  with a HEPA filter or a double-thickness filter at least two times each week. · Stay inside when air pollution is bad. Avoid paint fumes, perfumes, and other strong odors. · Avoid conditions that make your allergies worse. Stay away from smoke. Do not smoke or let anyone else smoke in your house. Do not use fireplaces or wood-burning stoves. · If you are allergic to your pets, change the air filter in your furnace every month. Use high-efficiency filters. · If you are allergic to pet dander, keep pets outside or out of your bedroom. Old carpet and cloth furniture can hold a lot of animal dander. You may need to replace them. When should you call for help? Give an epinephrine shot if:  · You think you are having a severe allergic reaction. · You have symptoms in more than one body area, such as mild nausea and an itchy mouth. After giving an epinephrine shot call 911, even if you feel better. Call 911 if:  · You have symptoms of a severe allergic reaction. These may include:  ¨ Sudden raised, red areas (hives) all over your body.   ¨ Swelling of the throat, mouth, lips, or tongue. ¨ Trouble breathing. ¨ Passing out (losing consciousness). Or you may feel very lightheaded or suddenly feel weak, confused, or restless. · You have been given an epinephrine shot, even if you feel better. Call your doctor now or seek immediate medical care if:  · You have symptoms of an allergic reaction, such as:  ¨ A rash or hives (raised, red areas on the skin). ¨ Itching. ¨ Swelling. ¨ Belly pain, nausea, or vomiting. Watch closely for changes in your health, and be sure to contact your doctor if:  · You do not get better as expected. Where can you learn more? Go to http://indra-mario.info/. Enter A605 in the search box to learn more about \"Allergies: Care Instructions. \"  Current as of: April 3, 2017  Content Version: 11.3  © 6720-0987 [employer]. Care instructions adapted under license by [employer] (which disclaims liability or warranty for this information). If you have questions about a medical condition or this instruction, always ask your healthcare professional. [place] [address] any warranty or liability for your use of this information. Influenza (Flu) Vaccine (Inactivated or Recombinant): What You Need to Know  Why get vaccinated? Influenza (\"flu\") is a contagious disease that spreads around the [place] every winter, usually between October and May. Flu is caused by influenza viruses and is spread mainly by coughing, sneezing, and close contact. Anyone can get flu. Flu strikes suddenly and can last several days. Symptoms vary by age, but can include:  · Fever/chills. · Sore throat. · Muscle aches. · Fatigue. · Cough. · Headache. · Runny or stuffy nose. Flu can also lead to pneumonia and blood infections, and cause diarrhea and seizures in children. If you have a medical condition, such as heart or lung disease, flu can make it worse. Flu is more dangerous for some people.  Infants and young children, people 72years of age and older, pregnant women, and people with certain health conditions or a weakened immune system are at greatest risk. Each year thousands of people in the [facility] die from flu, and many more are hospitalized. Flu vaccine can:  · Keep you from getting flu. · Make flu less severe if you do get it. · Keep you from spreading flu to your family and other people. Inactivated and recombinant flu vaccines  A dose of flu vaccine is recommended every flu season. Children 6 months through 6years of age may need two doses during the same flu season. Everyone else needs only one dose each flu season. Some inactivated flu vaccines contain a very small amount of a mercury-based preservative called thimerosal. Studies have not shown thimerosal in vaccines to be harmful, but flu vaccines that do not contain thimerosal are available. There is no live flu virus in flu shots. They cannot cause the flu. There are many flu viruses, and they are always changing. Each year a new flu vaccine is made to protect against three or four viruses that are likely to cause disease in the upcoming flu season. But even when the vaccine doesn't exactly match these viruses, it may still provide some protection. Flu vaccine cannot prevent:  · Flu that is caused by a virus not covered by the vaccine. · Illnesses that look like flu but are not. Some people should not get this vaccine  Tell the person who is giving you the vaccine:  · If you have any severe (life-threatening) allergies. If you ever had a life-threatening allergic reaction after a dose of flu vaccine, or have a severe allergy to any part of this vaccine, you may be advised not to get vaccinated. Most, but not all, types of flu vaccine contain a small amount of egg protein. · If you ever had Guillain-Barré syndrome (also called GBS) Some people with a history of GBS should not get this vaccine.  This should be discussed with your doctor. · If you are not feeling well. It is usually okay to get flu vaccine when you have a mild illness, but you might be asked to come back when you feel better. Risks of a vaccine reaction  With any medicine, including vaccines, there is a chance of reactions. These are usually mild and go away on their own, but serious reactions are also possible. Most people who get a flu shot do not have any problems with it. Minor problems following a flu shot include:  · Soreness, redness, or swelling where the shot was given  · Hoarseness  · Sore, red or itchy eyes  · Cough  · Fever  · Aches  · Headache  · Itching  · Fatigue  If these problems occur, they usually begin soon after the shot and last 1 or 2 days. More serious problems following a flu shot can include the following:  · There may be a small increased risk of Guillain-Barré Syndrome (GBS) after inactivated flu vaccine. This risk has been estimated at 1 or 2 additional cases per million people vaccinated. This is much lower than the risk of severe complications from flu, which can be prevented by flu vaccine. · [address] [place] [address] children who get the flu shot along with pneumococcal vaccine (PCV13) and/or DTaP vaccine at the same time might be slightly more likely to have a seizure caused by fever. Ask your doctor for more information. Tell your doctor if a child who is getting flu vaccine has ever had a seizure  Problems that could happen after any injected vaccine:  · People sometimes faint after a medical procedure, including vaccination. Sitting or lying down for about 15 minutes can help prevent fainting, and injuries caused by a fall. Tell your doctor if you feel dizzy, or have vision changes or ringing in the ears. · Some people get severe pain in the shoulder and have difficulty moving the arm where a shot was given. This happens very rarely. · Any medication can cause a severe allergic reaction.  Such reactions from a vaccine are very rare, estimated at about 1 in a million doses, and would happen within a few minutes to a few hours after the vaccination. As with any medicine, there is a very remote chance of a vaccine causing a serious injury or death. The safety of vaccines is always being monitored. For more information, visit: www.cdc.gov/vaccinesafety/. What if there is a serious reaction? What should I look for? · Look for anything that concerns you, such as signs of a severe allergic reaction, very high fever, or unusual behavior. Signs of a severe allergic reaction can include hives, swelling of the face and throat, difficulty breathing, a fast heartbeat, dizziness, and weakness - usually within a few minutes to a few hours after the vaccination. What should I do? · If you think it is a severe allergic reaction or other emergency that can't wait, call 9-1-1 and get the person to the nearest hospital. Otherwise, call your doctor. · Reactions should be reported to the \"Vaccine Adverse Event Reporting System\" (VAERS). Your doctor should file this report, or you can do it yourself through the VAERS website at www.vaers. [facility].gov, or by calling 9-692.837.4253. VAERS does not give medical advice. The National Vaccine Injury Compensation Program  The National Vaccine Injury Compensation Program (VICP) is a federal program that was created to compensate people who may have been injured by certain vaccines. Persons who believe they may have been injured by a vaccine can learn about the program and about filing a claim by calling 1-916.302.2499 or visiting the [facility] [employer] website at www.[facility].gov/vaccinecompensation. There is a time limit to file a claim for compensation. How can I learn more? · Ask your healthcare provider. He or she can give you the vaccine package insert or suggest other sources of information. · Call your local or state health department.   · Contact the Centers for Disease Control and Prevention (CDC):  ¨ Call 5-156.790.1104 (6-163-MUZ-INFO) or  ¨ Visit CDC's website at www.cdc.gov/flu  Vaccine Information Statement  Inactivated Influenza Vaccine  8/7/2015)  42 DILEEP Workman 815GL-15  Department of Health and Human Services  Centers for Disease Control and Prevention  Many Vaccine Information Statements are available in Urdu and other languages. See www.immunize.org/vis. Muchas hojas de información sobre vacunas están disponibles en español y en otros idiomas. Visite www.immunize.org/vis. Care instructions adapted under license by [employer] (which disclaims liability or warranty for this information). If you have questions about a medical condition or this instruction, always ask your healthcare professional. [place] [address] any warranty or liability for your use of this information.

## 2017-10-19 NOTE — LETTER
10/19/2017 3:49 PM 
 
Ms. [address] [facility] [address] Patient received influenza vaccine today at this office. Sincerely, Nayana Mayer NP

## 2017-10-19 NOTE — PROGRESS NOTES
1. Have you been to the ER, urgent care clinic since your last visit? Hospitalized since your last visit? No    2. Have you seen or consulted any other health care providers outside of the [address] [place] [address] since your last visit? Include any pap smears or colon screening. No   23year old female presents to office with incessant tickle in back of throat and coughing. Not taking any medications for cough or throat tickle. ROS: +nasal congestion, denies sore throat  Patient also has noticed bright rectal blood after bowel movement for past week. ROS denies abdominal pain, denies constipation or diarrhea, denies hemorrhoids  Health maintenance reviewed    OBJECTIVE:  Awake and alert in no acute distress  HEENT: nares patent with erythema, boggy, clear secretions bilaterally. TMs retracted bilaterally, pharynx without erythema, neck supple with shotty lymphadenopathy. No sinus pressure bilaterally  Lungs clear throughout  S1 S2 RRR without ectopy or murmur auscultated. Abdomen: normoactive bowel sounds all quadrants, no tenderness to abdomen upper and lower quadrants. No hepatosplenomegaly  Rectal exam: negative without mass, lesions or tenderness, sphincter tone normal, stool guaiac negative. Integumentary: no rashes  Visit Vitals    BP 96/67    Pulse 78    Temp 97.8 °F (36.6 °C) (Oral)    Resp 18    Ht 5' 3\" (1.6 m)    Wt 123 lb (55.8 kg)    BMI 21.79 kg/m2       Diagnoses and all orders for this visit:    1. Allergic rhinitis, unspecified chronicity, unspecified seasonality, unspecified trigger  -     budesonide (RHINOCORT AQUA) 32 mcg/actuation nasal spray; 2 Sprays by Both Nostrils route daily as needed for Rhinitis or Allergies. 2. Encounter for immunization  -     INFLUENZA VIRUS VACCINE QUADRIVALENT, PRESERVATIVE FREE SYRINGE (25634)    3.  Bright red rectal bleeding  -     AMB POC BLOOD OCCULT QUAL FECAL HEMGLBN 1-3    Reviewed risks and benefits and common side effects of new medication  General comfort measures  Reviewed risks and benefits and common side effects of immunization reviewed. I have discussed the diagnosis with the patient and the intended plan as seen in the above orders. The patient has received an after-visit summary and questions were answered concerning future plans. I have discussed medication side effects and warnings with the patient as well. Follow-up Disposition:  Return if symptoms worsen or fail to improve.

## 2017-12-19 ENCOUNTER — OFFICE VISIT (OUTPATIENT)
Dept: INTERNAL MEDICINE CLINIC | Age: 22
End: 2017-12-19

## 2017-12-19 VITALS
SYSTOLIC BLOOD PRESSURE: 100 MMHG | TEMPERATURE: 98 F | HEART RATE: 73 BPM | WEIGHT: 122 LBS | HEIGHT: 63 IN | DIASTOLIC BLOOD PRESSURE: 62 MMHG | BODY MASS INDEX: 21.62 KG/M2 | RESPIRATION RATE: 14 BRPM | OXYGEN SATURATION: 99 %

## 2017-12-19 DIAGNOSIS — R05.9 COUGH: Primary | ICD-10-CM

## 2017-12-19 DIAGNOSIS — J30.9 ALLERGIC RHINITIS, UNSPECIFIED CHRONICITY, UNSPECIFIED SEASONALITY, UNSPECIFIED TRIGGER: ICD-10-CM

## 2017-12-19 RX ORDER — BENZOCAINE .13; .15; .5; 2 G/100G; G/100G; G/100G; G/100G
2 GEL ORAL
Qty: 1 BOTTLE | Refills: 3 | Status: SHIPPED | OUTPATIENT
Start: 2017-12-19 | End: 2021-04-23 | Stop reason: ALTCHOICE

## 2017-12-19 RX ORDER — BENZONATATE 200 MG/1
200 CAPSULE ORAL
Qty: 30 CAP | Refills: 0 | Status: SHIPPED | OUTPATIENT
Start: 2017-12-19 | End: 2018-07-30 | Stop reason: ALTCHOICE

## 2017-12-19 RX ORDER — PHENOL/SODIUM PHENOLATE
20 AEROSOL, SPRAY (ML) MUCOUS MEMBRANE DAILY
Qty: 30 TAB | Refills: 0 | Status: SHIPPED | OUTPATIENT
Start: 2017-12-19 | End: 2018-01-18

## 2017-12-19 RX ORDER — PREDNISONE 20 MG/1
TABLET ORAL
Qty: 18 TAB | Refills: 0 | Status: SHIPPED | OUTPATIENT
Start: 2017-12-19 | End: 2018-07-30 | Stop reason: ALTCHOICE

## 2017-12-19 NOTE — MR AVS SNAPSHOT
Visit Information Date & Time Provider Department Dept. Phone Encounter #  
 12/19/2017 11:30 AM Stef [facility] [phone] [id number] Upcoming Health Maintenance Date Due Hepatitis A Peds Age 1-18 (1 of 2 - Standard Series) 2/3/1998 HPV AGE 9Y-26Y (1 of 3 - Female 3 Dose Series) 2/3/2008 DTaP/Tdap/Td series (7 - Td) 3/25/2018 Allergies as of 12/19/2017  Review Complete On: 12/19/2017 By: Ignacio Bass No Known Allergies Current Immunizations  Reviewed on 7/20/2015 Name Date DTaP 4/19/2001, 2/2/1998, 1997, 1997, 1997 Hep B Vaccine 1997, 1997, 1997 Hep B Vaccine (Adult) 7/17/2015 Hib 11/13/1998, 2/2/1998, 1997, 1997, 1997 Influenza Vaccine (Quad) PF 10/19/2017 MMR 4/19/2001, 2/9/1998 Meningococcal (MCV4P) Vaccine 6/26/2015 Poliovirus vaccine 2/2/1998, 1997, 1997 TB Skin Test (PPD) Intradermal 8/15/2017  2:32 PM, 7/29/2016 11:15 AM, 7/17/2015  3:22 PM, 7/8/2015 11:41 AM, 6/26/2015 10:55 AM, 9/29/2014 Tdap 3/25/2008 Varicella Virus Vaccine 7/17/2015, 3/30/2009 Not reviewed this visit Vitals BP Pulse Temp Resp Height(growth percentile) Weight(growth percentile) 100/62 (BP 1 Location: Right arm, BP Patient Position: Sitting) 73 98 °F (36.7 °C) (Oral) 14 5' 3\" (1.6 m) 122 lb (55.3 kg) LMP SpO2 BMI OB Status Smoking Status 11/23/2017 99% 21.61 kg/m2 Having regular periods Never Smoker Vitals History BMI and BSA Data Body Mass Index Body Surface Area  
 21.61 kg/m 2 1.57 m 2 Preferred Pharmacy Pharmacy Name Phone [address] [place] [address] [facility] [phone] Your Updated Medication List  
  
   
This list is accurate as of: 12/19/17 11:58 AM.  Always use your most recent med list.  
  
  
  
  
 budesonide 32 mcg/actuation nasal spray Commonly known as:  RHINOCORT AQUA  
2 Sprays by Both Nostrils route daily as needed for Rhinitis or Allergies. butalbital-acetaminophen-caffeine -40 mg per tablet Commonly known as:  Gennett Madura Take 1 Tab by mouth every six (6) hours as needed for Pain. Max Daily Amount: 4 Tabs. norgestimate-ethinyl estradiol 0.18/0.215/0.25 mg-25 mcg Tab Commonly known as:  ORTHO TRI-CYCLEN LO (28) Take 1 Tab by mouth daily. Introducing [facility]! Dorothy Pablo introduces [employer] patient portal. Now you can access parts of your medical record, email your doctor's office, and request medication refills online. 1. In your internet browser, go to [email or web address] 2. Click on the First Time User? Click Here link in the Sign In box. You will see the New Member Sign Up page. 3. Enter your [employer] Access Code exactly as it appears below. You will not need to use this code after youve completed the sign-up process. If you do not sign up before the expiration date, you must request a new code. · [employer] Access Code: [email or web address] Expires: 1/17/2018  2:50 PM 
 
4. Enter the last four digits of your Social Security Number (xxxx) and Date of Birth (mm/dd/yyyy) as indicated and click Submit. You will be taken to the next sign-up page. 5. Create a [employer] ID. This will be your [employer] login ID and cannot be changed, so think of one that is secure and easy to remember. 6. Create a [employer] password. You can change your password at any time. 7. Enter your Password Reset Question and Answer. This can be used at a later time if you forget your password. 8. Enter your e-mail address. You will receive e-mail notification when new information is available in [address] 9. Click Sign Up. You can now view and download portions of your medical record. 10. Click the Download Summary menu link to download a portable copy of your medical information. If you have questions, please visit the Frequently Asked Questions section of the [employer] website. Remember, [employer] is NOT to be used for urgent needs. For medical emergencies, dial 911. Now available from your iPhone and Android! Please provide this summary of care documentation to your next provider. Your primary care clinician is listed as Lex Birmingham. If you have any questions after today's visit, please call [phone].

## 2017-12-19 NOTE — PROGRESS NOTES
HPI/History  Denis Escalona is a 23 y.o.  female who presents for evaluation. Pt reports cough for about 10 days but waking her ~0200 every morning over the last 7 days. Awakening cough can progress near the point of emesis. Cough also present during day but not as severe. Clear sputum if present. Admits to mild postnasal drainage and used her rhinocort for only a few days without much benefit and sounds to have stopped. Tried OTC antihistamines shortly as well without much benefit. She admits to some wheezing and night-time reflux during this time. No fevers, malaise, or other significant sxs or complaints. There is no problem list on file for this patient. History reviewed. No pertinent past medical history. History reviewed. No pertinent surgical history. Social History     Social History    Marital status: SINGLE     Spouse name: N/A    Number of children: N/A    Years of education: N/A     Occupational History    Student - [employer] 2017      Social History Main Topics    Smoking status: Never Smoker    Smokeless tobacco: Never Used    Alcohol use No    Drug use: Not on file    Sexual activity: Not on file     Other Topics Concern    Not on file     Social History Narrative     Family History   Problem Relation Age of Onset    Hypertension Maternal Grandfather      Current Outpatient Prescriptions   Medication Sig    benzonatate (TESSALON) 200 mg capsule Take 1 Cap by mouth three (3) times daily as needed for Cough.  predniSONE (DELTASONE) 20 mg tablet Take 3 tabs by mouth daily x 3 days, then 2 tabs daily x 3 days, then 1 tab daily x 3 days.  Omeprazole delayed release (PRILOSEC D/R) 20 mg tablet Take 1 Tab by mouth daily for 30 days.  budesonide (RHINOCORT AQUA) 32 mcg/actuation nasal spray 2 Sprays by Both Nostrils route daily as needed for Rhinitis or Allergies.  norgestimate-ethinyl estradiol (ORTHO TRI-CYCLEN LO, 28,) 0.18/0.215/0.25 mg-25 mcg tab Take 1 Tab by mouth daily.     butalbital-acetaminophen-caffeine (FIORICET, ESGIC) -40 mg per tablet Take 1 Tab by mouth every six (6) hours as needed for Pain. Max Daily Amount: 4 Tabs. No current facility-administered medications for this visit. No Known Allergies    Review of Systems  Aside from those included in HPI, remainder of complete ROS negative. Physical Examination  Visit Vitals    /62 (BP 1 Location: Right arm, BP Patient Position: Sitting)    Pulse 73    Temp 98 °F (36.7 °C) (Oral)    Resp 14    Ht 5' 3\" (1.6 m)    Wt 122 lb (55.3 kg)    LMP 11/23/2017    SpO2 99%    BMI 21.61 kg/m2       General - Alert and in no acute distress. Pt appears well, comfortable, and in good spirits. Pleasant, engaging. Nontoxic. Not anxious, non-diaphoretic. Mental status - Appropriate mood, behavior, speech content, dress, and thought processes. Eyes - Pupils equal and reactive, extraocular movements intact. No erythema or discharge. Ears - Auditory canals appear normal.  TMs appear normal.  Nose - No erythema. No rhinorrhea. Mouth - Mucous membranes moist. Pharynx without lesions, swelling, erythema, or exudate. Normal phonation. Neck - Supple without rigidity. Lymph - No periauricular, perimandibular, or ant/post cervical tenderness or swelling. Pulm - Very rare, minimal, throat clearing, dry cough. Full, complete sentences. No tachypnea, retractions, or cyanosis. Good respiratory effort. Clear to auscultation bilat. No appreciable wheezes, rales, or rhonchi. Cardiovascular - Normal rate, regular rhythm. Assessment and Plan  1. Cough - No definite origin but discussed differentials. Most likely considerations would be reflux, postnasal drainage, reactivity/sensitivity (asthma, environment, viral, other). Lower likelihood of pertussis. Will try prilosec and preventive measures as discussed at length along with steroid taper and rhinocort regularly for now. Tessalon also provided.  Will see how she fares over the next few weeks. Return/call or visit PCP if needed. Further planning as warranted. Pt happily agrees with plan. PLEASE NOTE:   This document has been produced using voice recognition software. Unrecognized errors in transcription may be present.     Maria enVerid [employer] of [address] [place] [address]  [phone]  12/19/2017

## 2017-12-19 NOTE — PROGRESS NOTES
1. Have you been to the ER, urgent care clinic or hospitalized since your last visit? NO.     2. Have you seen or consulted any other health care providers outside of the [address] [place] [address] since your last visit (Include any pap smears or colon screening)? NO      Do you have an Advanced Directive? NO    Would you like information on Advanced Directives?  NO

## 2018-07-01 DIAGNOSIS — N92.6 IRREGULAR MENSES: ICD-10-CM

## 2018-07-30 ENCOUNTER — OFFICE VISIT (OUTPATIENT)
Dept: FAMILY MEDICINE CLINIC | Age: 23
End: 2018-07-30

## 2018-07-30 VITALS
HEART RATE: 76 BPM | DIASTOLIC BLOOD PRESSURE: 68 MMHG | BODY MASS INDEX: 22.15 KG/M2 | SYSTOLIC BLOOD PRESSURE: 102 MMHG | TEMPERATURE: 98.6 F | HEIGHT: 63 IN | WEIGHT: 125 LBS | OXYGEN SATURATION: 98 % | RESPIRATION RATE: 16 BRPM

## 2018-07-30 DIAGNOSIS — T78.40XA ALLERGIC REACTION, INITIAL ENCOUNTER: Primary | ICD-10-CM

## 2018-07-30 NOTE — PROGRESS NOTES
1. Have you been to the ER, urgent care clinic since your last visit? Hospitalized since your last visit? No 
 
2. Have you seen or consulted any other health care providers outside of the [address] [place] [address] since your last visit? Include any pap smears or colon screening.  No

## 2018-07-30 NOTE — PROGRESS NOTES
HISTORY OF PRESENT ILLNESS Nita Elise is a 26 y.o. female. HPI Patient is here today for evaluation and treatment of: Lip Swelling Lip Swelling: states that last Tuesday she was gardening and her top lip brushed up against her top lips. Thereafter she developed 2 mornings bumps on her lips with moderate redness and dryness of the lips that was progressive. No rash anywhere else on the body , no throat closing sx; No temp -subjective or otherwise. No malaise; she used benadryl. Current Outpatient Prescriptions:   TRINESSA LO 0.18/0.215/0.25 mg-25 mcg tab, TAKE 1 TABLET BY MOUTH DAILY, Disp: 84 Tab, Rfl: 0 
  budesonide (RHINOCORT AQUA) 32 mcg/actuation nasal spray, 2 Sprays by Both Nostrils route daily as needed for Rhinitis or Allergies. , Disp: 1 Bottle, Rfl: 3 
  butalbital-acetaminophen-caffeine (FIORICET, ESGIC) -40 mg per tablet, Take 1 Tab by mouth every six (6) hours as needed for Pain. Max Daily Amount: 4 Tabs., Disp: 30 Tab, Rfl: 1 PMH,  Meds, Allergies, Family History, Social history reviewed Review of Systems Constitutional: Negative for chills and fever. Cardiovascular: Negative for chest pain and palpitations. Skin: Positive for rash. Physical Exam  
Visit Vitals  /68 (BP 1 Location: Right arm, BP Patient Position: Sitting)  Pulse 76  Temp 98.6 °F (37 °C) (Oral)  Resp 16  
 Ht 5' 3\" (1.6 m)  Wt 125 lb (56.7 kg)  LMP 07/06/2018 (Exact Date)  SpO2 98%  BMI 22.14 kg/m2 General appearance: alert, cooperative, no distress, appears stated age TMs clear bilaterally Lungs: clear to auscultation bilaterally Heart: regular rate and rhythm, S1, S2 normal, no murmur, click, rub or gallop Extremities: no edema Skin of lips - dry;  Very minimal papular lesions noted just at the vermillion border. ASSESSMENT and PLAN 
  ICD-10-CM ICD-9-CM 1. Allergic reaction, initial encounter T78.40XA 995.3 As above, new,no systemic effects;  Probable local reaction to the scratch; keep lips moisturized; For further irritation she can consider a small amount of cortaid to the vermillion border only. alrm signs for worsening sx reviewed Follow-up Disposition: 
Return if symptoms worsen or fail to improve. An After Visit Summary was printed and given to the patient. This has been fully explained to the patient, who indicates understanding.

## 2018-07-30 NOTE — MR AVS SNAPSHOT
[address] [place], [place] [address]ashlyn Aguilar [address] 
[phone] Patient: Renae Basilio MRN: [id number]:[id number] Visit Information Date & Time Provider Department Dept. Phone Encounter #  
 7/30/2018  8:20 AM Yaron Winston, [address] [place] [address] [phone] [id number] Upcoming Health Maintenance Date Due  
 HPV Age 9Y-34Y (1 of 1 - Female 3 Dose Series) 2/3/2008 PAP AKA CERVICAL CYTOLOGY 2/3/2018 DTaP/Tdap/Td series (7 - Td) 3/25/2018 Influenza Age 5 to Adult 8/1/2018 Allergies as of 7/30/2018  Review Complete On: 7/30/2018 By: Yaron Winston MD  
 No Known Allergies Current Immunizations  Reviewed on 7/20/2015 Name Date DTaP 4/19/2001, 2/2/1998, 1997, 1997, 1997 Hep B Vaccine 1997, 1997, 1997 Hep B Vaccine (Adult) 7/17/2015 Hib 11/13/1998, 2/2/1998, 1997, 1997, 1997 Influenza Vaccine (Quad) PF 10/19/2017 MMR 4/19/2001, 2/9/1998 Meningococcal (MCV4P) Vaccine 6/26/2015 Poliovirus vaccine 2/2/1998, 1997, 1997 TB Skin Test (PPD) Intradermal 8/15/2017  2:32 PM, 7/29/2016 11:15 AM, 7/17/2015  3:22 PM, 7/8/2015 11:41 AM, 6/26/2015 10:55 AM, 9/29/2014 Tdap 3/25/2008 Varicella Virus Vaccine 7/17/2015, 3/30/2009 Not reviewed this visit You Were Diagnosed With   
  
 Codes Comments Allergic reaction, initial encounter    -  Primary ICD-10-CM: T78.40XA ICD-9-CM: 254. 3 Vitals BP Pulse Temp Resp Height(growth percentile) Weight(growth percentile) 102/68 (BP 1 Location: Right arm, BP Patient Position: Sitting) 76 98.6 °F (37 °C) (Oral) 16 5' 3\" (1.6 m) 125 lb (56.7 kg) LMP SpO2 BMI OB Status Smoking Status 07/06/2018 (Exact Date) 98% 22.14 kg/m2 Having regular periods Never Smoker BMI and BSA Data Body Mass Index Body Surface Area  
 22.14 kg/m 2 1.59 m 2 Preferred Pharmacy Pharmacy Name Phone [address] [phone]  [facility] [phone] Your Updated Medication List  
  
   
This list is accurate as of 7/30/18  9:01 AM.  Always use your most recent med list.  
  
  
  
  
 budesonide 32 mcg/actuation nasal spray Commonly known as:  RHINOCORT AQUA  
2 Sprays by Both Nostrils route daily as needed for Rhinitis or Allergies. butalbital-acetaminophen-caffeine -40 mg per tablet Commonly known as:  Kurt Dagmar Take 1 Tab by mouth every six (6) hours as needed for Pain. Max Daily Amount: 4 Tabs. TRINESSA LO 0.18/0.215/0.25 mg-25 mcg Tab Generic drug:  norgestimate-ethinyl estradiol TAKE 1 TABLET BY MOUTH DAILY Patient Instructions Allergic Reaction: Care Instructions Your Care Instructions An allergic reaction is an excessive response from your immune system to a medicine, chemical, food, insect bite, or other substance. A reaction can range from mild to life-threatening. Some people have a mild rash, hives, and itching or stomach cramps. In severe reactions, swelling of your tongue and throat can close up your airway so that you cannot breathe. Follow-up care is a key part of your treatment and safety. Be sure to make and go to all appointments, and call your doctor if you are having problems. It's also a good idea to know your test results and keep a list of the medicines you take. How can you care for yourself at home? · If you know what caused your allergic reaction, be sure to avoid it. Your allergy may become more severe each time you have a reaction. · Take an over-the-counter antihistamine, such as cetirizine (Zyrtec) or loratadine (Claritin), to treat mild symptoms. Read and follow directions on the label. Some antihistamines can make you feel sleepy.  Do not give antihistamines to a child unless you have checked with your doctor first. Mild symptoms include sneezing or an itchy or runny nose; an itchy mouth; a few hives or mild itching; and mild nausea or stomach discomfort. · Do not scratch hives or a rash. Put a cold, moist towel on them or take cool baths to relieve itching. Put ice packs on hives, swelling, or insect stings for 10 to 15 minutes at a time. Put a thin cloth between the ice pack and your skin. Do not take hot baths or showers. They will make the itching worse. · Your doctor may prescribe a shot of epinephrine to carry with you in case you have a severe reaction. Learn how to give yourself the shot and keep it with you at all times. Make sure it is not . · Go to the emergency room every time you have a severe reaction, even if you have used your shot of epinephrine and are feeling better. Symptoms can come back after a shot. · Wear medical alert jewelry that lists your allergies. You can buy this at most drugstores. · If your child has a severe allergy, make sure that his or her teachers, babysitters, coaches, and other caregivers know about the allergy. They should have an epinephrine shot, know how and when to give it, and have a plan to take your child to the hospital. 
When should you call for help? Give an epinephrine shot if: 
  · You think you are having a severe allergic reaction.  
  · You have symptoms in more than one body area, such as mild nausea and an itchy mouth.  
 After giving an epinephrine shot call 911, even if you feel better. 
 Call 911 if: 
  · You have symptoms of a severe allergic reaction. These may include: 
¨ Sudden raised, red areas (hives) all over your body. ¨ Swelling of the throat, mouth, lips, or tongue. ¨ Trouble breathing. ¨ Passing out (losing consciousness). Or you may feel very lightheaded or suddenly feel weak, confused, or restless.  
  · You have been given an epinephrine shot, even if you feel better.  
 Call your doctor now or seek immediate medical care if:   · You have symptoms of an allergic reaction, such as: ¨ A rash or hives (raised, red areas on the skin). ¨ Itching. ¨ Swelling. ¨ Belly pain, nausea, or vomiting.  
 Watch closely for changes in your health, and be sure to contact your doctor if: 
  · You do not get better as expected. Where can you learn more? Go to http://indra-mario.info/. Enter T463 in the search box to learn more about \"Allergic Reaction: Care Instructions. \" Current as of: October 6, 2017 Content Version: 11.7 © 3159-9561 [employer]. Care instructions adapted under license by [employer] (which disclaims liability or warranty for this information). If you have questions about a medical condition or this instruction, always ask your healthcare professional. [place] [address] any warranty or liability for your use of this information. Hydrocortisone (Aveeno 1% Hydrocortisone Anti-Itch Cream, Caldecort, Cortaid Maximum Strength, Dermasorb HC Complete Kit) - (On the skin) Why this medicine is used:  
Treats skin irritation, allergic reactions, and other types of skin problems. Contact a nurse or doctor right away if you have: · Uneven heartbeat 
· Skin color changes, thinning or poor healing skin, burning, itching, dryness · Depression, mood swings, trouble sleeping, usual thoughts, feelings, or behavior · Swelling in your hands, ankles, or feet, weight gain · Dry mouth, increased thirst, muscle cramps, nausea or vomiting · Eye pain, vision changes, fever, chills, cough, sore throat, body aches Common side effects: 
· Changes in menstrual periods · Weight gain around your neck, upper back, breast, face or waist 
© 2017 [address] Information is for End User's use only and may not be sold, redistributed or otherwise used for commercial purposes. Introducing \[facility] [place] [facility]\"" & HEALTH SERVICES! Kamala Reeder introduces [employer] patient portal. Now you can access parts of your medical record, email your doctor's office, and request medication refills online. 1. In your internet browser, go to [email or web address] 2. Click on the First Time User? Click Here link in the Sign In box. You will see the New Member Sign Up page. 3. Enter your [employer] Access Code exactly as it appears below. You will not need to use this code after youve completed the sign-up process. If you do not sign up before the expiration date, you must request a new code. · [employer] Access Code: [email or web address] Expires: 10/28/2018  8:55 AM 
 
4. Enter the last four digits of your Social Security Number (xxxx) and Date of Birth (mm/dd/yyyy) as indicated and click Submit. You will be taken to the next sign-up page. 5. Create a [employer] ID. This will be your [employer] login ID and cannot be changed, so think of one that is secure and easy to remember. 6. Create a [employer] password. You can change your password at any time. 7. Enter your Password Reset Question and Answer. This can be used at a later time if you forget your password. 8. Enter your e-mail address. You will receive e-mail notification when new information is available in [address] 9. Click Sign Up. You can now view and download portions of your medical record. 10. Click the Download Summary menu link to download a portable copy of your medical information. If you have questions, please visit the Frequently Asked Questions section of the [employer] website. Remember, [employer] is NOT to be used for urgent needs. For medical emergencies, dial 911. Now available from your iPhone and Android! Please provide this summary of care documentation to your next provider. Your primary care clinician is listed as Jenniffer Cobos. If you have any questions after today's visit, please call [phone].

## 2018-07-30 NOTE — PATIENT INSTRUCTIONS
Allergic Reaction: Care Instructions Your Care Instructions An allergic reaction is an excessive response from your immune system to a medicine, chemical, food, insect bite, or other substance. A reaction can range from mild to life-threatening. Some people have a mild rash, hives, and itching or stomach cramps. In severe reactions, swelling of your tongue and throat can close up your airway so that you cannot breathe. Follow-up care is a key part of your treatment and safety. Be sure to make and go to all appointments, and call your doctor if you are having problems. It's also a good idea to know your test results and keep a list of the medicines you take. How can you care for yourself at home? · If you know what caused your allergic reaction, be sure to avoid it. Your allergy may become more severe each time you have a reaction. · Take an over-the-counter antihistamine, such as cetirizine (Zyrtec) or loratadine (Claritin), to treat mild symptoms. Read and follow directions on the label. Some antihistamines can make you feel sleepy. Do not give antihistamines to a child unless you have checked with your doctor first. Mild symptoms include sneezing or an itchy or runny nose; an itchy mouth; a few hives or mild itching; and mild nausea or stomach discomfort. · Do not scratch hives or a rash. Put a cold, moist towel on them or take cool baths to relieve itching. Put ice packs on hives, swelling, or insect stings for 10 to 15 minutes at a time. Put a thin cloth between the ice pack and your skin. Do not take hot baths or showers. They will make the itching worse. · Your doctor may prescribe a shot of epinephrine to carry with you in case you have a severe reaction. Learn how to give yourself the shot and keep it with you at all times. Make sure it is not . · Go to the emergency room every time you have a severe reaction, even if you have used your shot of epinephrine and are feeling better. Symptoms can come back after a shot. · Wear medical alert jewelry that lists your allergies. You can buy this at most drugstores. · If your child has a severe allergy, make sure that his or her teachers, babysitters, coaches, and other caregivers know about the allergy. They should have an epinephrine shot, know how and when to give it, and have a plan to take your child to the hospital. 
When should you call for help? Give an epinephrine shot if: 
  · You think you are having a severe allergic reaction.  
  · You have symptoms in more than one body area, such as mild nausea and an itchy mouth.  
 After giving an epinephrine shot call 911, even if you feel better. 
 Call 911 if: 
  · You have symptoms of a severe allergic reaction. These may include: 
¨ Sudden raised, red areas (hives) all over your body. ¨ Swelling of the throat, mouth, lips, or tongue. ¨ Trouble breathing. ¨ Passing out (losing consciousness). Or you may feel very lightheaded or suddenly feel weak, confused, or restless.  
  · You have been given an epinephrine shot, even if you feel better.  
 Call your doctor now or seek immediate medical care if: 
  · You have symptoms of an allergic reaction, such as: ¨ A rash or hives (raised, red areas on the skin). ¨ Itching. ¨ Swelling. ¨ Belly pain, nausea, or vomiting.  
 Watch closely for changes in your health, and be sure to contact your doctor if: 
  · You do not get better as expected. Where can you learn more? Go to http://indra-mario.info/. Enter R097 in the search box to learn more about \"Allergic Reaction: Care Instructions. \" Current as of: October 6, 2017 Content Version: 11.7 © 1358-1487 [employer]. Care instructions adapted under license by [employer] (which disclaims liability or warranty for this information).  If you have questions about a medical condition or this instruction, always ask your healthcare professional. [employer] disclaims any warranty or liability for your use of this information. Hydrocortisone (Aveeno 1% Hydrocortisone Anti-Itch Cream, Caldecort, Cortaid Maximum Strength, Dermasorb HC Complete Kit) - (On the skin) Why this medicine is used:  
Treats skin irritation, allergic reactions, and other types of skin problems. Contact a nurse or doctor right away if you have: · Uneven heartbeat 
· Skin color changes, thinning or poor healing skin, burning, itching, dryness · Depression, mood swings, trouble sleeping, usual thoughts, feelings, or behavior · Swelling in your hands, ankles, or feet, weight gain · Dry mouth, increased thirst, muscle cramps, nausea or vomiting · Eye pain, vision changes, fever, chills, cough, sore throat, body aches Common side effects: 
· Changes in menstrual periods · Weight gain around your neck, upper back, breast, face or waist 
© 2017 [facility] Information is for End User's use only and may not be sold, redistributed or otherwise used for commercial purposes.

## 2018-09-25 DIAGNOSIS — N92.6 IRREGULAR MENSES: ICD-10-CM

## 2018-12-21 DIAGNOSIS — N92.6 IRREGULAR MENSES: ICD-10-CM

## 2021-04-23 ENCOUNTER — OFFICE VISIT (OUTPATIENT)
Dept: FAMILY MEDICINE CLINIC | Age: 26
End: 2021-04-23
Payer: COMMERCIAL

## 2021-04-23 VITALS
DIASTOLIC BLOOD PRESSURE: 66 MMHG | TEMPERATURE: 97.3 F | RESPIRATION RATE: 20 BRPM | BODY MASS INDEX: 27.46 KG/M2 | HEART RATE: 66 BPM | OXYGEN SATURATION: 98 % | WEIGHT: 155 LBS | HEIGHT: 63 IN | SYSTOLIC BLOOD PRESSURE: 107 MMHG

## 2021-04-23 DIAGNOSIS — Z01.419 WELL WOMAN EXAM WITH ROUTINE GYNECOLOGICAL EXAM: Primary | ICD-10-CM

## 2021-04-23 DIAGNOSIS — Z12.4 SCREENING FOR CERVICAL CANCER: ICD-10-CM

## 2021-04-23 DIAGNOSIS — Z01.84 IMMUNITY STATUS TESTING: ICD-10-CM

## 2021-04-23 DIAGNOSIS — Z23 NEED FOR TDAP VACCINATION: ICD-10-CM

## 2021-04-23 PROCEDURE — 90715 TDAP VACCINE 7 YRS/> IM: CPT | Performed by: NURSE PRACTITIONER

## 2021-04-23 PROCEDURE — 99395 PREV VISIT EST AGE 18-39: CPT | Performed by: NURSE PRACTITIONER

## 2021-04-23 NOTE — PATIENT INSTRUCTIONS
Well Visit, Ages 25 to 48: Care Instructions Overview Well visits can help you stay healthy. Your doctor has checked your overall health and may have suggested ways to take good care of yourself. Your doctor also may have recommended tests. At home, you can help prevent illness with healthy eating, regular exercise, and other steps. Follow-up care is a key part of your treatment and safety. Be sure to make and go to all appointments, and call your doctor if you are having problems. It's also a good idea to know your test results and keep a list of the medicines you take. How can you care for yourself at home? · Get screening tests that you and your doctor decide on. Screening helps find diseases before any symptoms appear. · Eat healthy foods. Choose fruits, vegetables, whole grains, protein, and low-fat dairy foods. Limit fat, especially saturated fat. Reduce salt in your diet. · Limit alcohol. If you are a man, have no more than 2 drinks a day or 14 drinks a week. If you are a woman, have no more than 1 drink a day or 7 drinks a week. · Get at least 30 minutes of physical activity on most days of the week. Walking is a good choice. You also may want to do other activities, such as running, swimming, cycling, or playing tennis or team sports. Discuss any changes in your exercise program with your doctor. · Reach and stay at a healthy weight. This will lower your risk for many problems, such as obesity, diabetes, heart disease, and high blood pressure. · Do not smoke or allow others to smoke around you. If you need help quitting, talk to your doctor about stop-smoking programs and medicines. These can increase your chances of quitting for good. · Care for your mental health. It is easy to get weighed down by worry and stress. Learn strategies to manage stress, like deep breathing and mindfulness, and stay connected with your family and community.  If you find you often feel sad or hopeless, talk with your doctor. Treatment can help. · Talk to your doctor about whether you have any risk factors for sexually transmitted infections (STIs). You can help prevent STIs if you wait to have sex with a new partner (or partners) until you've each been tested for STIs. It also helps if you use condoms (male or female condoms) and if you limit your sex partners to one person who only has sex with you. Vaccines are available for some STIs, such as HPV. · Use birth control if it's important to you to prevent pregnancy. Talk with your doctor about the choices available and what might be best for you. · If you think you may have a problem with alcohol or drug use, talk to your doctor. This includes prescription medicines (such as amphetamines and opioids) and illegal drugs (such as cocaine and methamphetamine). Your doctor can help you figure out what type of treatment is best for you. · Protect your skin from too much sun. When you're outdoors from 10 a.m. to 4 p.m., stay in the shade or cover up with clothing and a hat with a wide brim. Wear sunglasses that block UV rays. Even when it's cloudy, put broad-spectrum sunscreen (SPF 30 or higher) on any exposed skin. · See a dentist one or two times a year for checkups and to have your teeth cleaned. · Wear a seat belt in the car. When should you call for help? Watch closely for changes in your health, and be sure to contact your doctor if you have any problems or symptoms that concern you. Where can you learn more? Go to http://www.Webspy.com/ Enter P072 in the search box to learn more about \"Well Visit, Ages 25 to 48: Care Instructions. \" Current as of: May 27, 2020               Content Version: 12.8 © 8886-4468 [employer]wise, [employer]. Care instructions adapted under license by [employer] (which disclaims liability or warranty for this information).  If you have questions about a medical condition or this instruction, always ask your healthcare professional. [place] [address] any warranty or liability for your use of this information. Tdap (Tetanus, Diphtheria, Pertussis) Vaccine: What You Need to Know Why get vaccinated? Tdap vaccine can prevent tetanus, diphtheria, and pertussis. Diphtheria and pertussis spread from person to person. Tetanus enters the body through cuts or wounds. · TETANUS (T) causes painful stiffening of the muscles. Tetanus can lead to serious health problems, including being unable to open the mouth, having trouble swallowing and breathing, or death. · DIPHTHERIA (D) can lead to difficulty breathing, heart failure, paralysis, or death. · PERTUSSIS (aP), also known as \"whooping cough,\" can cause uncontrollable, violent coughing which makes it hard to breathe, eat, or drink. Pertussis can be extremely serious in babies and young children, causing pneumonia, convulsions, brain damage, or death. In teens and adults, it can cause weight loss, loss of bladder control, passing out, and rib fractures from severe coughing. Tdap vaccine Tdap is only for children 7 years and older, adolescents, and adults. Adolescents should receive a single dose of Tdap, preferably at age 6 or 15 years. Pregnant women should get a dose of Tdap during every pregnancy, to protect the  from pertussis. Infants are most at risk for severe, life threatening complications from pertussis. Adults who have never received Tdap should get a dose of Tdap. Also, adults should receive a booster dose every 10 years, or earlier in the case of a severe and dirty wound or burn. Booster doses can be either Tdap or Td (a different vaccine that protects against tetanus and diphtheria but not pertussis). Tdap may be given at the same time as other vaccines. Talk with your health care provider Tell your vaccine provider if the person getting the vaccine: 
· Has had an allergic reaction after a previous dose of any vaccine that protects against tetanus, diphtheria, or pertussis, or has any severe, life threatening allergies. · Has had a coma, decreased level of consciousness, or prolonged seizures within 7 days after a previous dose of any pertussis vaccine (DTP, DTaP, or Tdap). · Has seizures or another nervous system problem. · Has ever had Guillain-Barré Syndrome (also called GBS). · Has had severe pain or swelling after a previous dose of any vaccine that protects against tetanus or diphtheria. In some cases, your health care provider may decide to postpone Tdap vaccination to a future visit. People with minor illnesses, such as a cold, may be vaccinated. People who are moderately or severely ill should usually wait until they recover before getting Tdap vaccine. Your health care provider can give you more information. Risks of a vaccine reaction · Pain, redness, or swelling where the shot was given, mild fever, headache, feeling tired, and nausea, vomiting, diarrhea, or stomachache sometimes happen after Tdap vaccine. People sometimes faint after medical procedures, including vaccination. Tell your provider if you feel dizzy or have vision changes or ringing in the ears. As with any medicine, there is a very remote chance of a vaccine causing a severe allergic reaction, other serious injury, or death. What if there is a serious problem? An allergic reaction could occur after the vaccinated person leaves the clinic. If you see signs of a severe allergic reaction (hives, swelling of the face and throat, difficulty breathing, a fast heartbeat, dizziness, or weakness), call 9-1-1 and get the person to the nearest hospital. 
For other signs that concern you, call your health care provider. Adverse reactions should be reported to the Vaccine Adverse Event Reporting System (VAERS). Your health care provider will usually file this report, or you can do it yourself. Visit the VAERS website at www.vaers. hhs.gov or call 0-180.660.7823. VAERS is only for reporting reactions, and [facility] staff do not give medical advice. The National Vaccine Injury Compensation Program 
The National Vaccine Injury Compensation Program (VICP) is a federal program that was created to compensate people who may have been injured by certain vaccines. Visit the VICP website at www.hrsa.gov/vaccinecompensation or call 2-174.719.3846 to learn about the program and about filing a claim. There is a time limit to file a claim for compensation. How can I learn more? · Ask your health care provider. · Call your local or state health department. · Contact the Centers for Disease Control and Prevention (CDC): 
? Call 0-975.181.4169 (1-800-CDC-INFO) or 
? Visit CDC's website at www.cdc.gov/vaccines Vaccine Information Statement (Interim) Tdap (Tetanus, Diphtheria, Pertussis) Vaccine 04/01/2020 
42 Josselyn Sharla Joselin [id number] Replaced by [facility] and [employer] Centers for Disease Control and Prevention Many Vaccine Information Statements are available in [place] and other languages. See www.immunize.org/vis. Muchas hojas de información sobre vacunas están disponibles en español y en otros idiomas. Visite www.immunize.org/vis. Care instructions adapted under license by [employer] (which disclaims liability or warranty for this information). If you have questions about a medical condition or this instruction, always ask your healthcare professional. [place] [address] any warranty or liability for your use of this information.

## 2021-04-23 NOTE — PROGRESS NOTES
Subjective:   27 y.o. female for Well Woman Check. Patient's last menstrual period was 04/06/2021. Social History: not sexually active. Pertinent past medical history: no history of HTN, DVT, CAD, DM, liver disease, migraines or smoking. There is no problem list on file for this patient. There are no active problems to display for this patient. Current Outpatient Medications   Medication Sig Dispense Refill    TRINESSA LO 0.18/0.215/0.25 mg-25 mcg tab TAKE 1 TABLET BY MOUTH DAILY 84 Tab 0    budesonide (RHINOCORT AQUA) 32 mcg/actuation nasal spray 2 Sprays by Both Nostrils route daily as needed for Rhinitis or Allergies. 1 Bottle 3    butalbital-acetaminophen-caffeine (FIORICET, ESGIC) -40 mg per tablet Take 1 Tab by mouth every six (6) hours as needed for Pain. Max Daily Amount: 4 Tabs. 30 Tab 1     No Known Allergies  History reviewed. No pertinent past medical history. History reviewed. No pertinent surgical history. Family History   Problem Relation Age of Onset    Hypertension Maternal Grandfather      Social History     Tobacco Use    Smoking status: Never Smoker    Smokeless tobacco: Never Used   Substance Use Topics    Alcohol use: No        ROS:  Feeling well. No dyspnea or chest pain on exertion. No abdominal pain, change in bowel habits, black or bloody stools. No urinary tract symptoms. GYN ROS: normal menses, no abnormal bleeding, pelvic pain or discharge, no breast pain or new or enlarging lumps on self exam. No neurological complaints. Objective:     Visit Vitals  /66 (BP 1 Location: Left arm, BP Patient Position: Sitting, BP Cuff Size: Adult)   Pulse 66   Temp 97.3 °F (36.3 °C) (Temporal)   Resp 20   Ht 5' 3\" (1.6 m)   Wt 155 lb (70.3 kg)   LMP 04/06/2021   SpO2 98%   BMI 27.46 kg/m²     The patient appears well, alert, oriented x 3, in no distress. ENT normal.  Neck supple. No adenopathy or thyromegaly. JEM.  Lungs are clear, good air entry, no wheezes, rhonchi or rales. S1 and S2 normal, no murmurs, regular rate and rhythm. Abdomen soft without tenderness, guarding, mass or organomegaly. Extremities show no edema, normal peripheral pulses. Neurological is normal, no focal findings. BREAST EXAM: breasts appear normal, no suspicious masses, no skin or nipple changes or axillary nodes    PELVIC EXAM: VULVA: normal appearing vulva with no masses, tenderness or lesions, VAGINA: normal appearing vagina with normal color and discharge, no lesions, CERVIX: normal appearing cervix without discharge or lesions, cervical motion tenderness absent, UTERUS: uterus is normal size, shape, consistency and nontender, ADNEXA: normal adnexa in size, nontender and no masses, PAP: Pap smear done today    Assessment/Plan:   well woman  pap smear  return annually or prn    ICD-10-CM ICD-9-CM    1. Well woman exam with routine gynecological exam  Z01.419 V72.31     [V72.31]   2. Screening for cervical cancer  Z12.4 V76.2 PAP IG, RFX APTIMA HPV ASCUS ([id number])   3. Immunity status testing  Z01.84 V72.61 RUBELLA AB, IGG      RUBEOLA AB, IGG      MUMPS AB, IGG      VARICELLA ZOSTER ABS, IGG/IGM      HEP B SURFACE AB   4. Need for Tdap vaccination  Z23 V06.1 TETANUS, DIPHTHERIA TOXOIDS AND ACELLULAR PERTUSSIS VACCINE (TDAP), IN INDIVIDS. >=7, IM     Orders Placed This Encounter    TETANUS, DIPHTHERIA TOXOIDS AND ACELLULAR PERTUSSIS VACCINE (TDAP), IN INDIVIDS. >=7, IM    RUBELLA AB, IGG    RUBEOLA AB, IGG    MUMPS AB, IGG    VARICELLA ZOSTER ABS, IGG/IGM    HEP B SURFACE AB    PAP IG, RFX APTIMA HPV ASCUS ([id number])     [employer] form completed and copy obtained for EMR  I have discussed the diagnosis with the patient and the intended plan as seen in the above orders. The patient has received an after-visit summary and questions were answered concerning future plans. I have discussed medication side effects and warnings with the patient as well.  Patient agreeable with above plan and verbalizes understanding. Follow-up and Dispositions    · Return in about 1 year (around 4/23/2022) for Tristan Dunham, in office follow up.

## 2021-04-26 ENCOUNTER — CLINICAL SUPPORT (OUTPATIENT)
Dept: FAMILY MEDICINE CLINIC | Age: 26
End: 2021-04-26
Payer: COMMERCIAL

## 2021-04-26 DIAGNOSIS — Z11.1 SCREENING EXAMINATION FOR PULMONARY TUBERCULOSIS: ICD-10-CM

## 2021-04-26 DIAGNOSIS — Z23 ENCOUNTER FOR IMMUNIZATION: ICD-10-CM

## 2021-04-26 LAB
HBV SURFACE AB SER QL: REACTIVE
MEV IGG SER IA-ACNC: 85.3 AU/ML
MM INDURATION POC: NORMAL (ref 0–5)
MUV IGG SER IA-ACNC: 71.6 AU/ML
PPD POC: NORMAL
RUBV IGG SERPL IA-ACNC: 4.89 INDEX
VZV IGG SER IA-ACNC: <135 INDEX
VZV IGM SER IA-ACNC: <0.91 INDEX (ref 0–0.9)

## 2021-04-26 PROCEDURE — 86580 TB INTRADERMAL TEST: CPT | Performed by: NURSE PRACTITIONER

## 2021-04-26 NOTE — PROGRESS NOTES
Patient presented to office for PPD placement. Reviewed allergies. Patient states she has never had a positive PPD in the past. PPD injection at 4:30 time of placement in right forearm. Patient advised to return 48-72hrs for reading. Patient tolerated injection well and left ambulatory without any complaints.  Patient verbalizes understanding

## 2021-04-27 LAB
CYTOLOGIST CVX/VAG CYTO: NORMAL
CYTOLOGY CVX/VAG DOC CYTO: NORMAL
CYTOLOGY CVX/VAG DOC THIN PREP: NORMAL
DX ICD CODE: NORMAL
LABCORP, 190119: NORMAL
Lab: NORMAL
OTHER STN SPEC: NORMAL
STAT OF ADQ CVX/VAG CYTO-IMP: NORMAL

## 2021-04-28 LAB
MM INDURATION POC: 0 MM (ref 0–5)
PPD POC: NEGATIVE NEGATIVE

## 2021-05-14 ENCOUNTER — TELEPHONE (OUTPATIENT)
Dept: FAMILY MEDICINE CLINIC | Age: 26
End: 2021-05-14

## 2021-05-14 NOTE — TELEPHONE ENCOUNTER
Pt came into office requesting a nurse to contact her regarding her recent lab results.        Please advise  [phone]

## 2021-05-18 NOTE — TELEPHONE ENCOUNTER
Please advise she is not immune to varicella. She will need to receive vaccination. Please inform other labs are consistent with immunity.

## 2021-05-18 NOTE — TELEPHONE ENCOUNTER
Left message for patient to return call to the office. Will need Varicella vaccination as she is not immune.

## 2021-05-19 ENCOUNTER — PATIENT MESSAGE (OUTPATIENT)
Dept: FAMILY MEDICINE CLINIC | Age: 26
End: 2021-05-19

## 2021-05-24 ENCOUNTER — CLINICAL SUPPORT (OUTPATIENT)
Dept: FAMILY MEDICINE CLINIC | Age: 26
End: 2021-05-24
Payer: COMMERCIAL

## 2021-05-24 VITALS — TEMPERATURE: 97 F

## 2021-05-24 DIAGNOSIS — Z23 ENCOUNTER FOR IMMUNIZATION: Primary | ICD-10-CM

## 2021-05-24 PROCEDURE — 90716 VAR VACCINE LIVE SUBQ: CPT | Performed by: NURSE PRACTITIONER

## 2021-05-24 PROCEDURE — 90471 IMMUNIZATION ADMIN: CPT | Performed by: NURSE PRACTITIONER

## 2021-05-24 NOTE — PROGRESS NOTES
Malika nAglin is a 27 y.o. female who presents for routine immunizations. She denies any symptoms , reactions or allergies that would exclude them from being immunized today. Risks and adverse reactions were discussed and the VIS was given to them. All questions were addressed. She was observed for 10 min post injection. There were no reactions observed. Verbal order for Varicella SQ received per Ozzie Anderson NP for pt Malika Anglin. Order written down and read back to provider for verification prior to completion.

## 2021-06-01 DIAGNOSIS — Z01.84 IMMUNITY STATUS TESTING: Primary | ICD-10-CM

## 2021-06-21 ENCOUNTER — TELEPHONE (OUTPATIENT)
Dept: FAMILY MEDICINE CLINIC | Age: 26
End: 2021-06-21

## 2021-06-22 ENCOUNTER — APPOINTMENT (OUTPATIENT)
Dept: FAMILY MEDICINE CLINIC | Age: 26
End: 2021-06-22

## 2021-06-23 LAB — VZV IGG SER IA-ACNC: 363 INDEX

## 2021-06-30 ENCOUNTER — VIRTUAL VISIT (OUTPATIENT)
Dept: FAMILY MEDICINE CLINIC | Age: 26
End: 2021-06-30
Payer: COMMERCIAL

## 2021-06-30 DIAGNOSIS — R23.3 ABNORMAL BRUISING: Primary | ICD-10-CM

## 2021-06-30 PROCEDURE — 99212 OFFICE O/P EST SF 10 MIN: CPT | Performed by: NURSE PRACTITIONER

## 2021-06-30 NOTE — PROGRESS NOTES
Carolina Gannon is a 27 y.o. female who was seen by synchronous (real-time) audio-video technology on 6/30/2021 for Bleeding/Bruising      Assessment & Plan:   Diagnoses and all orders for this visit:    1. Abnormal bruising  -     CBC WITH AUTOMATED DIFF; Future  -     METABOLIC PANEL, COMPREHENSIVE; Future  -     PTT; Future  -     PROTHROMBIN TIME + INR; Future      I spent at least 15 minutes on this visit with this established patient. 712  Subjective:   Patient states she has a history of bruising easily. Comments she was on vacation mid June and noticed she was bruising more. Comments she was participating in activities however, didn't injure herself. Advised to upload pictures of bruising into [employer]. Prior to Admission medications    Not on File     There is no problem list on file for this patient. There are no problems to display for this patient. No Known Allergies  No past medical history on file. No past surgical history on file. Family History   Problem Relation Age of Onset    Hypertension Maternal Grandfather      Social History     Tobacco Use    Smoking status: Never Smoker    Smokeless tobacco: Never Used   Substance Use Topics    Alcohol use: No       ROS    Objective:   No flowsheet data found. General: alert, cooperative, no distress   Mental  status: normal mood, behavior, speech, dress, motor activity, and thought processes, able to follow commands   HENT: NCAT   Neck: no visualized mass   Resp: no respiratory distress   Neuro: no gross deficits   Skin: no discoloration or lesions of concern on visible areas   Psychiatric: normal affect, consistent with stated mood, no evidence of hallucinations     Additional exam findings: We discussed the expected course, resolution and complications of the diagnosis(es) in detail. Medication risks, benefits, costs, interactions, and alternatives were discussed as indicated.   I advised her to contact the office if her condition worsens, changes or fails to improve as anticipated. She expressed understanding with the diagnosis(es) and plan. José Ang, was evaluated through a synchronous (real-time) audio-video encounter. The patient (or guardian if applicable) is aware that this is a billable service. Verbal consent to proceed has been obtained within the past 12 months. The visit was conducted pursuant to the emergency declaration under the [address] [place] [address] and the [employer] and [employer] General Act. Patient identification was verified, and a caregiver was present when appropriate. The patient was located in a state where the provider was credentialed to provide care.     Angel Shah NP

## 2021-07-06 ENCOUNTER — APPOINTMENT (OUTPATIENT)
Dept: FAMILY MEDICINE CLINIC | Age: 26
End: 2021-07-06

## 2021-07-07 LAB
ALBUMIN SERPL-MCNC: 4.3 G/DL (ref 3.9–5)
ALBUMIN/GLOB SERPL: 1.5 {RATIO} (ref 1.2–2.2)
ALP SERPL-CCNC: 77 IU/L (ref 48–121)
ALT SERPL-CCNC: 11 IU/L (ref 0–32)
APTT PPP: 26 SEC (ref 24–33)
AST SERPL-CCNC: 13 IU/L (ref 0–40)
BASOPHILS # BLD AUTO: 0.1 X10E3/UL (ref 0–0.2)
BASOPHILS NFR BLD AUTO: 1 %
BILIRUB SERPL-MCNC: 0.2 MG/DL (ref 0–1.2)
BUN SERPL-MCNC: 9 MG/DL (ref 6–20)
BUN/CREAT SERPL: 12 (ref 9–23)
CALCIUM SERPL-MCNC: 9.8 MG/DL (ref 8.7–10.2)
CHLORIDE SERPL-SCNC: 103 MMOL/L (ref 96–106)
CO2 SERPL-SCNC: 23 MMOL/L (ref 20–29)
CREAT SERPL-MCNC: 0.75 MG/DL (ref 0.57–1)
EOSINOPHIL # BLD AUTO: 0.2 X10E3/UL (ref 0–0.4)
EOSINOPHIL NFR BLD AUTO: 2 %
ERYTHROCYTE [DISTWIDTH] IN BLOOD BY AUTOMATED COUNT: 13.4 % (ref 11.7–15.4)
GLOBULIN SER CALC-MCNC: 2.8 G/DL (ref 1.5–4.5)
GLUCOSE SERPL-MCNC: 92 MG/DL (ref 65–99)
HCT VFR BLD AUTO: 40.2 % (ref 34–46.6)
HGB BLD-MCNC: 13.3 G/DL (ref 11.1–15.9)
IMM GRANULOCYTES # BLD AUTO: 0.1 X10E3/UL (ref 0–0.1)
IMM GRANULOCYTES NFR BLD AUTO: 1 %
INR PPP: 1 (ref 0.9–1.2)
LYMPHOCYTES # BLD AUTO: 3.3 X10E3/UL (ref 0.7–3.1)
LYMPHOCYTES NFR BLD AUTO: 32 %
MCH RBC QN AUTO: 27.1 PG (ref 26.6–33)
MCHC RBC AUTO-ENTMCNC: 33.1 G/DL (ref 31.5–35.7)
MCV RBC AUTO: 82 FL (ref 79–97)
MONOCYTES # BLD AUTO: 0.5 X10E3/UL (ref 0.1–0.9)
MONOCYTES NFR BLD AUTO: 4 %
NEUTROPHILS # BLD AUTO: 6.1 X10E3/UL (ref 1.4–7)
NEUTROPHILS NFR BLD AUTO: 60 %
PLATELET # BLD AUTO: 338 X10E3/UL (ref 150–450)
POTASSIUM SERPL-SCNC: 4.4 MMOL/L (ref 3.5–5.2)
PROT SERPL-MCNC: 7.1 G/DL (ref 6–8.5)
PROTHROMBIN TIME: 11.1 SEC (ref 9.1–12)
RBC # BLD AUTO: 4.91 X10E6/UL (ref 3.77–5.28)
SODIUM SERPL-SCNC: 140 MMOL/L (ref 134–144)
WBC # BLD AUTO: 10.2 X10E3/UL (ref 3.4–10.8)

## 2022-02-21 ENCOUNTER — VIRTUAL VISIT (OUTPATIENT)
Dept: FAMILY MEDICINE CLINIC | Age: 27
End: 2022-02-21
Payer: COMMERCIAL

## 2022-02-21 ENCOUNTER — NURSE TRIAGE (OUTPATIENT)
Dept: OTHER | Facility: CLINIC | Age: 27
End: 2022-02-21

## 2022-02-21 DIAGNOSIS — R06.02 SOB (SHORTNESS OF BREATH): ICD-10-CM

## 2022-02-21 DIAGNOSIS — R06.2 WHEEZING: ICD-10-CM

## 2022-02-21 DIAGNOSIS — R05.9 COUGH: Primary | ICD-10-CM

## 2022-02-21 PROCEDURE — 99214 OFFICE O/P EST MOD 30 MIN: CPT | Performed by: NURSE PRACTITIONER

## 2022-02-21 RX ORDER — GLUCOSAMINE SULFATE 1500 MG
POWDER IN PACKET (EA) ORAL DAILY
COMMUNITY

## 2022-02-21 RX ORDER — MONTELUKAST SODIUM 10 MG/1
10 TABLET ORAL
Qty: 30 TABLET | Refills: 0 | Status: SHIPPED | OUTPATIENT
Start: 2022-02-21 | End: 2022-02-24 | Stop reason: SDUPTHER

## 2022-02-21 RX ORDER — BISMUTH SUBSALICYLATE 262 MG
1 TABLET,CHEWABLE ORAL DAILY
COMMUNITY

## 2022-02-21 RX ORDER — ASCORBIC ACID 500 MG
TABLET ORAL DAILY
COMMUNITY

## 2022-02-21 RX ORDER — ALBUTEROL SULFATE 90 UG/1
1 AEROSOL, METERED RESPIRATORY (INHALATION)
Qty: 18 G | Refills: 0 | Status: SHIPPED | OUTPATIENT
Start: 2022-02-21 | End: 2022-02-24 | Stop reason: SDUPTHER

## 2022-02-21 NOTE — TELEPHONE ENCOUNTER
Received call from [facility]  at [facility] with [employer] Complaint. Subjective: Caller states +covid beginning Jan, coughing for past month, worse at night,non productive cough,ears itch,clear liquid comes out of ears,mild shortness of breath    Current Symptoms: cough, mildly short of breath,clear ear drainage,see above    Onset: 1 month ago    Associated Symptoms: NA    Pain Severity: Denies    Temperature: Denies    What has been tried: Nyquil    LMP: 2/3/22 Pregnant: No    Recommended disposition: See PCP today in office. [facility] if no appt. available    Care advice provided, patient verbalizes understanding; denies any other questions or concerns; instructed to call back for any new or worsening symptoms. Patient/Caller agrees with recommended disposition; writer provided warm transfer to Jordyn Cortes at [facility] for appointment scheduling    Attention Provider: Thank you for allowing me to participate in the care of your patient. The patient was connected to triage in response to information provided to the ECC. Please do not respond through this encounter as the response is not directed to a shared pool.     Reason for Disposition   Clear drainage (not from a head injury) and persists > 24 hours   MILD difficulty breathing (e.g., minimal/no SOB at rest, SOB with walking, pulse < 100) of new-onset or worse than normal    Protocols used: EAR - DISCHARGE-ADULT-OH, BREATHING DIFFICULTY-ADULT-OH

## 2022-02-21 NOTE — PROGRESS NOTES
Laxmi Ryan is a 24 y.o. female who was seen by synchronous (real-time) audio-video technology on 2/21/2022 for Cough, Shortness of Breath, Wheezing, and Ear Drainage      Assessment & Plan:   Diagnoses and all orders for this visit:    1. Cough  -     montelukast (SINGULAIR) 10 mg tablet; Take 1 Tablet by mouth nightly. -     albuterol (PROVENTIL HFA, VENTOLIN HFA, PROAIR HFA) 90 mcg/actuation inhaler; Take 1 Puff by inhalation every four (4) hours as needed for Wheezing, Shortness of Breath or Cough. 2. SOB (shortness of breath)  -     montelukast (SINGULAIR) 10 mg tablet; Take 1 Tablet by mouth nightly. -     albuterol (PROVENTIL HFA, VENTOLIN HFA, PROAIR HFA) 90 mcg/actuation inhaler; Take 1 Puff by inhalation every four (4) hours as needed for Wheezing, Shortness of Breath or Cough. 3. Wheezing  -     montelukast (SINGULAIR) 10 mg tablet; Take 1 Tablet by mouth nightly. -     albuterol (PROVENTIL HFA, VENTOLIN HFA, PROAIR HFA) 90 mcg/actuation inhaler; Take 1 Puff by inhalation every four (4) hours as needed for Wheezing, Shortness of Breath or Cough. 4. In addition to the above medications patient was also instructed to take Allegra, and Flonase 2 sprays daily in each nostril for her symptoms. Follow-up and Dispositions    · Return in about 2 weeks (around 3/7/2022) for Cough, shortness of breath, wheezing, itchy ears, with PCP. 12  Subjective:       Laxmi Ryan is a 24 y.o. y.o. female who complains of congestion, productive cough, cough described as productive of yellow sputum that is worse at night and itchy ears, and wheezing for 30 days. Pt did have COVID in January with complete resolution of symptoms. Associated with productive cough, drainage both sides, which have unchanged  since that time. She Patient denies a history of chest pain, chills and fevers. Pt denies a history of asthma. Patient denies smoke cigarettes. Tolerating PO: yes. Treatments have included: OTC products. Patient reports sick contacts: no. Pt is vaccinated and boosted for COVID.     allergic rhinitis. Prior to Admission medications    Medication Sig Start Date End Date Taking? Authorizing Provider   multivitamin (ONE A DAY) tablet Take 1 Tablet by mouth daily. Yes Provider, Historical   ascorbic acid, vitamin C, (Vitamin C) 500 mg tablet Take  by mouth daily. Yes Provider, Historical   cholecalciferol (Vitamin D3) 25 mcg (1,000 unit) cap Take  by mouth daily. Yes Provider, Historical   montelukast (SINGULAIR) 10 mg tablet Take 1 Tablet by mouth nightly. 2/21/22  Yes Alta Roach NP   albuterol (PROVENTIL HFA, VENTOLIN HFA, PROAIR HFA) 90 mcg/actuation inhaler Take 1 Puff by inhalation every four (4) hours as needed for Wheezing, Shortness of Breath or Cough. 2/21/22  Yes Alta Roach NP     ROS    Objective:   No flowsheet data found. General: alert, cooperative, no distress   Mental  status: normal mood, behavior, speech, dress, motor activity, and thought processes, able to follow commands   HENT: NCAT   Neck: no visualized mass   Resp: no respiratory distress   Neuro: no gross deficits   Skin: no discoloration or lesions of concern on visible areas   Psychiatric: normal affect, consistent with stated mood, no evidence of hallucinations     Additional exam findings: We discussed the expected course, resolution and complications of the diagnosis(es) in detail. Medication risks, benefits, costs, interactions, and alternatives were discussed as indicated. I advised her to contact the office if her condition worsens, changes or fails to improve as anticipated. She expressed understanding with the diagnosis(es) and plan. Jenna Medrano, was evaluated through a synchronous (real-time) audio-video encounter. The patient (or guardian if applicable) is aware that this is a billable service, which includes applicable co-pays. Verbal consent to proceed has been obtained.  The visit was conducted pursuant to the emergency declaration under the [facility], [address] [place] [address] authority and the "[employer] and [employer] General Act. Patient identification was verified, and a caregiver was present when appropriate. The patient was located at home in a state where the provider was licensed to provide care.       Clary Arriaza NP

## 2022-02-22 ENCOUNTER — TELEPHONE (OUTPATIENT)
Dept: FAMILY MEDICINE CLINIC | Age: 27
End: 2022-02-22

## 2022-02-22 NOTE — TELEPHONE ENCOUNTER
Patient was prescribed medication for her cough on yesterday. Patient states that her insurance does not cover her medication at [place].  Patient requesting if medication can be resent to     Trisha Hidalgo  [address]  [address] [address]

## 2022-02-24 DIAGNOSIS — R06.02 SOB (SHORTNESS OF BREATH): ICD-10-CM

## 2022-02-24 DIAGNOSIS — R06.2 WHEEZING: ICD-10-CM

## 2022-02-24 DIAGNOSIS — R05.9 COUGH: ICD-10-CM

## 2022-02-24 RX ORDER — MONTELUKAST SODIUM 10 MG/1
10 TABLET ORAL
Qty: 30 TABLET | Refills: 0 | Status: SHIPPED | OUTPATIENT
Start: 2022-02-24 | End: 2022-04-01 | Stop reason: SDUPTHER

## 2022-02-24 RX ORDER — ALBUTEROL SULFATE 90 UG/1
1 AEROSOL, METERED RESPIRATORY (INHALATION)
Qty: 18 G | Refills: 0 | Status: SHIPPED | OUTPATIENT
Start: 2022-02-24 | End: 2022-04-01 | Stop reason: SDUPTHER

## 2022-02-24 NOTE — TELEPHONE ENCOUNTER
----- Message from [place] Day sent at 2/23/2022 12:10 PM EST -----  Subject: Refill Request    QUESTIONS  Name of Medication? montelukast (SINGULAIR) 10 mg tablet  Patient-reported dosage and instructions? 1 daily  How many days do you have left? 0  Preferred Pharmacy? [facility]  [id number]  Pharmacy phone number (if available)? [phone]  ---------------------------------------------------------------------------  --------------,  Name of Medication? albuterol (PROVENTIL HFA, VENTOLIN HFA, PROAIR HFA) 90   mcg/actuation inhaler  Patient-reported dosage and instructions? 2 puffs instilled in lungs as   needed  How many days do you have left? 0  Preferred Pharmacy? [place] [address] [id number]  Pharmacy phone number (if available)? [phone]  ---------------------------------------------------------------------------  --------------  CALL BACK INFO  What is the best way for the office to contact you? OK to leave message on   voicemail  Preferred Call Back Phone Number?  [phone]

## 2022-02-24 NOTE — TELEPHONE ENCOUNTER
[facility] does not accept patient insurance.    Per pharmacy, rx was transferred to another pharmacy

## 2022-02-24 NOTE — TELEPHONE ENCOUNTER
Previous Rx's were sent to [facility]    Last Visit: 6/30/21 with NP Edgard Grant  Next Appointment: none    Requested Prescriptions     Pending Prescriptions Disp Refills    montelukast (SINGULAIR) 10 mg tablet 30 Tablet 0     Sig: Take 1 Tablet by mouth nightly.  albuterol (PROVENTIL HFA, VENTOLIN HFA, PROAIR HFA) 90 mcg/actuation inhaler 18 g 0     Sig: Take 1 Puff by inhalation every four (4) hours as needed for Wheezing, Shortness of Breath or Cough.

## 2022-04-01 ENCOUNTER — HOSPITAL ENCOUNTER (OUTPATIENT)
Dept: GENERAL RADIOLOGY | Age: 27
Discharge: HOME | End: 2022-04-01
Payer: COMMERCIAL

## 2022-04-01 ENCOUNTER — OFFICE VISIT (OUTPATIENT)
Dept: FAMILY MEDICINE CLINIC | Age: 27
End: 2022-04-01
Payer: COMMERCIAL

## 2022-04-01 VITALS
SYSTOLIC BLOOD PRESSURE: 121 MMHG | RESPIRATION RATE: 14 BRPM | HEIGHT: 63 IN | WEIGHT: 154 LBS | TEMPERATURE: 98.2 F | BODY MASS INDEX: 27.29 KG/M2 | HEART RATE: 83 BPM | OXYGEN SATURATION: 96 % | DIASTOLIC BLOOD PRESSURE: 71 MMHG

## 2022-04-01 DIAGNOSIS — M25.571 PAIN IN LATERAL PORTION OF RIGHT ANKLE: ICD-10-CM

## 2022-04-01 DIAGNOSIS — M25.571 PAIN IN LATERAL PORTION OF RIGHT ANKLE: Primary | ICD-10-CM

## 2022-04-01 DIAGNOSIS — R05.9 COUGH: ICD-10-CM

## 2022-04-01 DIAGNOSIS — R06.02 SOB (SHORTNESS OF BREATH): ICD-10-CM

## 2022-04-01 DIAGNOSIS — R06.2 WHEEZING: ICD-10-CM

## 2022-04-01 PROCEDURE — 99214 OFFICE O/P EST MOD 30 MIN: CPT | Performed by: NURSE PRACTITIONER

## 2022-04-01 PROCEDURE — 73610 X-RAY EXAM OF ANKLE: CPT

## 2022-04-01 RX ORDER — LEG BRACE
EACH MISCELLANEOUS
Qty: 1 EACH | Refills: 0 | Status: SHIPPED | OUTPATIENT
Start: 2022-04-01

## 2022-04-01 RX ORDER — MONTELUKAST SODIUM 10 MG/1
10 TABLET ORAL
Qty: 30 TABLET | Refills: 0 | Status: SHIPPED | OUTPATIENT
Start: 2022-04-01 | End: 2022-04-01

## 2022-04-01 RX ORDER — ALBUTEROL SULFATE 90 UG/1
2 AEROSOL, METERED RESPIRATORY (INHALATION)
Qty: 18 G | Refills: 3 | Status: SHIPPED | OUTPATIENT
Start: 2022-04-01

## 2022-04-01 RX ORDER — MONTELUKAST SODIUM 10 MG/1
10 TABLET ORAL
Qty: 90 TABLET | Refills: 2 | Status: SHIPPED | OUTPATIENT
Start: 2022-04-01

## 2022-04-01 RX ORDER — NORGESTIMATE AND ETHINYL ESTRADIOL 7DAYSX3 LO
1 KIT ORAL DAILY
Qty: 3 EACH | Refills: 2 | Status: SHIPPED | OUTPATIENT
Start: 2022-04-01

## 2022-04-01 RX ORDER — CETIRIZINE HCL 10 MG
10 TABLET ORAL
Qty: 90 TABLET | Refills: 1 | Status: SHIPPED
Start: 2022-04-01 | End: 2022-04-06 | Stop reason: SINTOL

## 2022-04-01 RX ORDER — INHALER, ASSIST DEVICES
1 SPACER (EA) MISCELLANEOUS AS NEEDED
Qty: 1 EACH | Refills: 0 | Status: SHIPPED | OUTPATIENT
Start: 2022-04-01

## 2022-04-01 NOTE — PROGRESS NOTES
Rosmery Barone presents today for   Chief Complaint   Patient presents with    Follow-up    Asthma    Other     pt is having irregular menstral     Foot Pain     right       Is someone accompanying this pt? no    Is the patient using any DME equipment during OV? no    Coordination of Care:  1. Have you been to the ER, urgent care clinic since your last visit? Hospitalized since your last visit? no    2. Have you seen or consulted any other health care providers outside of the [address] [place] [address] since your last visit? Include any pap smears or colon screening. no          Depression Screening:  3 most recent PHQ Screens 4/1/2022   Little interest or pleasure in doing things Not at all   Feeling down, depressed, irritable, or hopeless Not at all   Total Score PHQ 2 0       Learning Assessment:  Learning Assessment 7/20/2015   PRIMARY LEARNER Patient   HIGHEST LEVEL OF EDUCATION - PRIMARY LEARNER  GRADUATED HIGH SCHOOL OR GED   BARRIERS PRIMARY LEARNER NONE   CO-LEARNER CAREGIVER No   PRIMARY LANGUAGE ENGLISH   LEARNER PREFERENCE PRIMARY DEMONSTRATION     READING     LISTENING   ANSWERED BY patient   RELATIONSHIP SELF       Abuse Screening:  Abuse Screening Questionnaire 7/30/2018   Do you ever feel afraid of your partner? N   Are you in a relationship with someone who physically or mentally threatens you? N   Is it safe for you to go home? Y       Fall Risk  No flowsheet data found. ADL  No flowsheet data found. Travel Screening:    Travel Screening     Question   Response    In the last 10 days, have you been in contact with someone who was confirmed or suspected to have Coronavirus/COVID-19? No / Unsure    Have you had a COVID-19 viral test in the last 10 days? No    Do you have any of the following new or worsening symptoms? None of these    Have you traveled internationally or domestically in the last month?   No      Travel History   Travel since 03/01/22    No documented travel since 03/01/22         Health Maintenance reviewed and discussed and ordered per Provider. Health Maintenance Due   Topic Date Due    Hepatitis C Screening  Never done    HPV Age 9Y-34Y (1 - 2-dose series) Never done    Medicare Yearly Exam  Never done   .

## 2022-04-01 NOTE — PATIENT INSTRUCTIONS
Ankle Sprain: Rehab Exercises  Introduction  Here are some examples of exercises for you to try. The exercises may be suggested for a condition or for rehabilitation. Start each exercise slowly. Ease off the exercises if you start to have pain. You will be told when to start these exercises and which ones will work best for you. How to do the exercises  'Alphabet' exercise    1. Trace the alphabet with your toe. This helps your ankle move in all directions. Side-to-side knee swing exercise    1. Sit in a chair with your foot flat on the floor. 2. Slowly move your knee from side to side. Keep your foot pressed flat. 3. Continue this exercise for 2 to 3 minutes. Towel curl    1. While sitting, place your foot on a towel on the floor. Scrunch the towel toward you with your toes. 2. Then use your toes to push the towel away from you. 3. To make this exercise more challenging you can put something on the other end of the towel. A can of soup is about the right weight for this. Towel stretch    1. Sit with your legs extended and knees straight. 2. Place a towel around your foot just under the toes. 3. Hold each end of the towel in each hand, with your hands above your knees. 4. Pull back with the towel so that your foot stretches toward you. 5. Hold the position for at least 15 to 30 seconds. 6. Repeat 2 to 4 times a session. Do up to 5 sessions a day. Ankle eversion exercise    1. Start by sitting with your foot flat on the floor. Push your foot outward against a wall or a piece of furniture that doesn't move. Hold for about 6 seconds, and relax. Repeat 8 to 12 times. 2. After you feel comfortable with this, try using rubber tubing looped around the outside of your feet for resistance. Push your foot out to the side against the tubing, and then count to 10 as you slowly bring your foot back to the middle. Repeat 8 to 12 times. Isometric opposition exercises    1.  While sitting, put your feet together flat on the floor. 2. Press your injured foot inward against your other foot. Hold for about 6 seconds, and relax. Repeat 8 to 12 times. 3. Then place the heel of your other foot on top of the injured one. Push down with the top heel while trying to push up with your injured foot. Hold for about 6 seconds, and relax. Repeat 8 to 12 times. Resisted ankle inversion    1. Sit on the floor with your good leg crossed over your other leg. 2. Hold both ends of an exercise band and loop the band around the inside of your affected foot. Then press your other foot against the band. 3. Keeping your legs crossed, slowly push your affected foot against the band so that foot moves away from your other foot. Then slowly relax. 4. Repeat 8 to 12 times. Resisted ankle eversion    1. Sit on the floor with your legs straight. 2. Hold both ends of an exercise band and loop the band around the outside of your affected foot. Then press your other foot against the band. 3. Keeping your leg straight, slowly push your affected foot outward against the band and away from your other foot without letting your leg rotate. Then slowly relax. 4. Repeat 8 to 12 times. Resisted ankle dorsiflexion    1. Tie the ends of an exercise band together to form a loop. Attach one end of the loop to a secure object or shut a door on it to hold it in place. (Or you can have someone hold one end of the loop to provide resistance.)  2. While sitting on the floor or in a chair, loop the other end of the band over the top of your affected foot. 3. Keeping your knee and leg straight, slowly flex your foot to pull back on the exercise band, and then slowly relax. 4. Repeat 8 to 12 times. Single-leg balance    1. Stand on a flat surface with your arms stretched out to your sides like you are making the letter \"T. \" Then lift your good leg off the floor, bending it at the knee.  If you are not steady on your feet, use one hand to hold on to a chair, counter, or wall. 2. Standing on the leg with your affected ankle, keep that knee straight. Try to balance on that leg for up to 30 seconds. Then rest for up to 10 seconds. 3. Repeat 6 to 8 times. 4. When you can balance on your affected leg for 30 seconds with your eyes open, try to balance on it with your eyes closed. 5. When you can do this exercise with your eyes closed for 30 seconds and with ease and no pain, try standing on a pillow or piece of foam, and repeat steps 1 through 4. Follow-up care is a key part of your treatment and safety. Be sure to make and go to all appointments, and call your doctor if you are having problems. It's also a good idea to know your test results and keep a list of the medicines you take. Where can you learn more? Go to http://www.Assurely/  Enter Nathan Dupree in the search box to learn more about \"Ankle Sprain: Rehab Exercises. \"  Current as of: July 1, 2021               Content Version: 13.2  © 2006-2022 [employer]. Care instructions adapted under license by [employer] (which disclaims liability or warranty for this information). If you have questions about a medical condition or this instruction, always ask your healthcare professional. [address] any warranty or liability for your use of this information. Chronic Ankle Laxity: Exercises  Introduction  Here are some examples of exercises for you to try. The exercises may be suggested for a condition or for rehabilitation. Start each exercise slowly. Ease off the exercises if you start to have pain. You will be told when to start these exercises and which ones will work best for you. How to do the exercises  Resisted ankle inversion    1. Sit on the floor with your good leg crossed over your other leg. 2. Hold both ends of an exercise band and loop the band around the inside of your affected foot.  Then press your other foot against the band.  3. Keeping your legs crossed, slowly push your affected foot against the band so that foot moves away from your other foot. Then slowly relax. 4. Repeat 8 to 12 times. Resisted ankle eversion    1. Sit on the floor with your legs straight. 2. Hold both ends of an exercise band and loop the band around the outside of your affected foot. Then press your other foot against the band. 3. Keeping your leg straight, slowly push your affected foot outward against the band and away from your other foot without letting your leg rotate. Then slowly relax. 4. Repeat 8 to 12 times. Resisted ankle dorsiflexion    1. Tie the ends of an exercise band together to form a loop. Attach one end of the loop to a secure object or shut a door on it to hold it in place. (Or you can have someone hold one end of the loop to provide resistance.)  2. While sitting on the floor or in a chair, loop the other end of the band over the top of your affected foot. 3. Keeping your knee and leg straight, slowly flex your foot to pull back on the exercise band, and then slowly relax. 4. Repeat 8 to 12 times. Single-leg balance    1. Stand on a flat surface with your arms stretched out to your sides like you are making the letter \"T. \" Then lift your good leg off the floor, bending it at the knee. If you are not steady on your feet, use one hand to hold on to a chair, counter, or wall. 2. Standing on the leg with your affected ankle, keep that knee straight. Try to balance on that leg for up to 30 seconds. Then rest for up to 10 seconds. 3. Repeat 6 to 8 times. 4. When you can balance on your affected leg for 30 seconds with your eyes open, try to balance on it with your eyes closed. 5. When you can do this exercise with your eyes closed for 30 seconds and with ease and no pain, try standing on a pillow or piece of foam, and repeat steps 1 through 4. Follow-up care is a key part of your treatment and safety.  Be sure to make and go to all appointments, and call your doctor if you are having problems. It's also a good idea to know your test results and keep a list of the medicines you take. Where can you learn more? Go to http://www.gray.com/  Enter M820 in the search box to learn more about \"Chronic Ankle Laxity: Exercises. \"  Current as of: July 1, 2021               Content Version: 13.2  © 2006-2022 [employer]. Care instructions adapted under license by [employer] (which disclaims liability or warranty for this information). If you have questions about a medical condition or this instruction, always ask your healthcare professional. [address] any warranty or liability for your use of this information.

## 2022-04-01 NOTE — PROGRESS NOTES
Tristian Maxwell is a 24 y.o. female who was seen in clinic today (4/1/2022) for Follow-up, Asthma, Other (pt is having irregular menstral ), and Foot Pain (right)    Assessment & Plan:   Diagnoses and all orders for this visit:    1. Pain in lateral portion of right ankle  -     XR ANKLE RT MIN 3 V; Future    2. Cough  -     albuterol (PROVENTIL HFA, VENTOLIN HFA, PROAIR HFA) 90 mcg/actuation inhaler; Take 2 Puffs by inhalation every four (4) hours as needed for Wheezing, Shortness of Breath or Cough. -     montelukast (SINGULAIR) 10 mg tablet; Take 1 Tablet by mouth nightly. 3. SOB (shortness of breath)  -     albuterol (PROVENTIL HFA, VENTOLIN HFA, PROAIR HFA) 90 mcg/actuation inhaler; Take 2 Puffs by inhalation every four (4) hours as needed for Wheezing, Shortness of Breath or Cough. -     montelukast (SINGULAIR) 10 mg tablet; Take 1 Tablet by mouth nightly. 4. Wheezing  -     albuterol (PROVENTIL HFA, VENTOLIN HFA, PROAIR HFA) 90 mcg/actuation inhaler; Take 2 Puffs by inhalation every four (4) hours as needed for Wheezing, Shortness of Breath or Cough. -     montelukast (SINGULAIR) 10 mg tablet; Take 1 Tablet by mouth nightly. Other orders  -     Leg Brace (Ankle Brace) misc; by Does Not Apply route. Right ankle as needed for pain  -     norgestimate-ethinyl estradioL (ORTHO TRI-CYCLEN LO) 0.18/0.215/0.25 mg-25 mcg tab; Take 1 Tablet by mouth daily. -     inhalational spacing device (BreatheRite Valved MDI Chamber); 1 Each by Does Not Apply route as needed for Wheezing.  -     cetirizine (ZYRTEC) 10 mg tablet; Take 1 Tablet by mouth daily as needed for Allergies. I have discussed the diagnosis with the patient and the intended plan as seen in the above orders. The patient has received an after-visit summary and questions were answered concerning future plans. I have discussed medication side effects and warnings with the patient as well.  Patient agreeable with above plan and verbalizes understanding. Follow-up and Dispositions    · Return in about 4 weeks (around 4/29/2022) for irregular menses/allergies/asthma, virtual follow up. 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 Cycle interval is 36 days, menses last for 6 days  Patient states her first day symptoms of back pain, bloating and cramping all increase when her menstrual cycle is delayed. She has been on ocp previously for menstrual cycle with improvement. Right ankle: reports something doesn't feel right to her right lateral ankle. States symptoms began 12/2021. Comments symptoms seemed to have increased after she returned from a ski trip. Denies injury. Reports she has noticed increased pain with rolling her ankle a certain way. States she has felt some mild puffiness at times, denies visible swelling. Lab Results   Component Value Date/Time    Sodium 140 07/06/2021 11:09 AM    Potassium 4.4 07/06/2021 11:09 AM    Chloride 103 07/06/2021 11:09 AM    CO2 23 07/06/2021 11:09 AM    Glucose 92 07/06/2021 11:09 AM    BUN 9 07/06/2021 11:09 AM    Creatinine 0.75 07/06/2021 11:09 AM    BUN/Creatinine ratio 12 07/06/2021 11:09 AM    GFR est  07/06/2021 11:09 AM    GFR est non- 07/06/2021 11:09 AM    Calcium 9.8 07/06/2021 11:09 AM    Bilirubin, total 0.2 07/06/2021 11:09 AM    Alk.  phosphatase 77 07/06/2021 11:09 AM    Protein, total 7.1 07/06/2021 11:09 AM    Albumin 4.3 07/06/2021 11:09 AM    A-G Ratio 1.5 07/06/2021 11:09 AM    ALT (SGPT) 11 07/06/2021 11:09 AM    AST (SGOT) 13 07/06/2021 11:09 AM     Lab Results   Component Value Date/Time    WBC 10.2 07/06/2021 11:09 AM    HGB 13.3 07/06/2021 11:09 AM    HCT 40.2 07/06/2021 11:09 AM    PLATELET 583 72/77/4849 11:09 AM    MCV 82 07/06/2021 11:09 AM     Wt Readings from Last 3 Encounters:   04/23/21 155 lb (70.3 kg)   07/30/18 125 lb (56.7 kg)   12/19/17 122 lb (55.3 kg)     Temp Readings from Last 3 Encounters:   05/24/21 97 °F (36.1 °C) (Temporal)   04/23/21 97.3 °F (36.3 °C) (Temporal)   07/30/18 98.6 °F (37 °C) (Oral)     BP Readings from Last 3 Encounters:   04/23/21 107/66   07/30/18 102/68   12/19/17 100/62     Pulse Readings from Last 3 Encounters:   04/23/21 66   07/30/18 76   12/19/17 73       Prior to Admission medications    Medication Sig Start Date End Date Taking? Authorizing Provider   montelukast (SINGULAIR) 10 mg tablet Take 1 Tablet by mouth nightly. 2/24/22   Pili ARNOLD, EDUARDO   albuterol (PROVENTIL HFA, VENTOLIN HFA, PROAIR HFA) 90 mcg/actuation inhaler Take 1 Puff by inhalation every four (4) hours as needed for Wheezing, Shortness of Breath or Cough. 2/24/22   Pili ARNOLD NP   multivitamin (ONE A DAY) tablet Take 1 Tablet by mouth daily. Provider, Historical   ascorbic acid, vitamin C, (Vitamin C) 500 mg tablet Take  by mouth daily. Provider, Historical   cholecalciferol (Vitamin D3) 25 mcg (1,000 unit) cap Take  by mouth daily. Provider, Historical     The following sections were reviewed & updated as appropriate: PMH, PSH, FH, and SH. Review of Systems   Constitutional: Negative for activity change, appetite change, chills, fatigue and fever. Respiratory: Negative for chest tightness and shortness of breath. Cardiovascular: Negative for chest pain. Genitourinary: Positive for menstrual problem. Negative for vaginal bleeding and vaginal pain. Musculoskeletal: Positive for arthralgias (right lateral ankle pain). Neurological: Negative for dizziness and headaches. Objective:     Visit Vitals  /71 (BP 1 Location: Left upper arm, BP Patient Position: Sitting)   Pulse 83   Temp 98.2 °F (36.8 °C) (Temporal)   Resp 14   Ht 5' 3\" (1.6 m)   Wt 154 lb (69.9 kg)   LMP 03/11/2022   SpO2 96%   BMI 27.28 kg/m²      Physical Exam  Constitutional:       General: She is not in acute distress. Appearance: She is well-developed. HENT:      Head: Normocephalic and atraumatic. Right Ear: Tympanic membrane normal.      Left Ear: Tympanic membrane normal.   Neck:      Vascular: No carotid bruit. Cardiovascular:      Rate and Rhythm: Normal rate and regular rhythm. Heart sounds: Normal heart sounds. No murmur heard. No friction rub. No gallop. Pulmonary:      Effort: Pulmonary effort is normal.      Breath sounds: Normal breath sounds. No decreased breath sounds, wheezing, rhonchi or rales. Abdominal:      General: Bowel sounds are normal.      Palpations: Abdomen is soft. Tenderness: There is no abdominal tenderness. Musculoskeletal:      Cervical back: Normal range of motion and neck supple. Right ankle: Tenderness (with dorsiflexion) present over the lateral malleolus. Lymphadenopathy:      Cervical: No cervical adenopathy. Skin:     General: Skin is warm and dry. Neurological:      Mental Status: She is alert and oriented to person, place, and time. Disclaimer: The patient understands our medical plan. Alternatives have been explained and offered. The risks, benefits and significant side effects of all medications have been reviewed. Anticipated time course and progression of condition reviewed. All questions have been addressed. She is encouraged to employ the information provided in the after visit summary, which was reviewed. Where applicable, she is instructed to call the clinic if she has not been notified either by phone or through [address] with the results of her tests or with an appointment plan for any referrals within 1 week(s). No news is not good news; it's no news. The patient  is to call if her condition worsens or fails to improve or if significant side effects are experienced. Aspects of this note may have been generated using voice recognition software. Despite editing, there may be unrecognized errors.        Jeannine Hall NP

## 2022-04-05 ENCOUNTER — PATIENT MESSAGE (OUTPATIENT)
Dept: FAMILY MEDICINE CLINIC | Age: 27
End: 2022-04-05

## 2022-04-06 RX ORDER — MINERAL OIL
180 ENEMA (ML) RECTAL
Qty: 90 TABLET | Refills: 1 | Status: SHIPPED
Start: 2022-04-06 | End: 2022-04-14

## 2022-04-14 RX ORDER — LORATADINE 10 MG
10 TABLET,DISINTEGRATING ORAL DAILY
Qty: 90 TABLET | Refills: 1 | Status: SHIPPED | OUTPATIENT
Start: 2022-04-14

## 2022-05-04 ENCOUNTER — VIRTUAL VISIT (OUTPATIENT)
Dept: FAMILY MEDICINE CLINIC | Age: 27
End: 2022-05-04
Payer: COMMERCIAL

## 2022-05-04 DIAGNOSIS — J30.9 ALLERGIC RHINITIS, UNSPECIFIED SEASONALITY, UNSPECIFIED TRIGGER: Primary | ICD-10-CM

## 2022-05-04 PROCEDURE — 99212 OFFICE O/P EST SF 10 MIN: CPT | Performed by: NURSE PRACTITIONER

## 2022-05-04 NOTE — PROGRESS NOTES
Alexis Robertson is a 24 y.o. female who was seen by synchronous (real-time) audio-video technology on 5/4/2022 for No chief complaint on file. Assessment & Plan:   Diagnoses and all orders for this visit:    1. Allergic rhinitis, unspecified seasonality, unspecified trigger      Follow-up and Dispositions    · Return if symptoms worsen or fail to improve. I spent at least 10 minutes on this visit with this established patient. 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 Reports she has been using zyrtec on the days she doesn't have to get up too early. She does express concerns regarding use of albuterol. Comments she has noticed when she is outside for long periods of time she may have to use her inhaler more than once. Comments she doesn't have to use her albuterol daily. Advise current albuterol use is okay. If she begins to have use her albuterol daily, multiple times per day to inform provider. Informed ICS will need to be considered at that time. Reports she hasn't started birth control pills as of yet. Plans to begin after her next menstrual cycle. 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 Cycle interval is 36 days, menses last for 6 days  Patient states her first day symptoms of back pain, bloating and cramping all increase when her menstrual cycle is delayed. She has been on ocp previously for menstrual cycle with improvement. Right ankle: reports something doesn't feel right to her right lateral ankle. States symptoms began 12/2021. Comments symptoms seemed to have increased after she returned from a ski trip. Denies injury. Reports she has noticed increased pain with rolling her ankle a certain way.   States she has felt some mild puffiness at times, denies visible swelling. Assessment & Plan:   Diagnoses and all orders for this visit:    1. Pain in lateral portion of right ankle  -     XR ANKLE RT MIN 3 V; Future    2. Cough  -     albuterol (PROVENTIL HFA, VENTOLIN HFA, PROAIR HFA) 90 mcg/actuation inhaler; Take 2 Puffs by inhalation every four (4) hours as needed for Wheezing, Shortness of Breath or Cough. -     montelukast (SINGULAIR) 10 mg tablet; Take 1 Tablet by mouth nightly. 3. SOB (shortness of breath)  -     albuterol (PROVENTIL HFA, VENTOLIN HFA, PROAIR HFA) 90 mcg/actuation inhaler; Take 2 Puffs by inhalation every four (4) hours as needed for Wheezing, Shortness of Breath or Cough. -     montelukast (SINGULAIR) 10 mg tablet; Take 1 Tablet by mouth nightly. 4. Wheezing  -     albuterol (PROVENTIL HFA, VENTOLIN HFA, PROAIR HFA) 90 mcg/actuation inhaler; Take 2 Puffs by inhalation every four (4) hours as needed for Wheezing, Shortness of Breath or Cough. -     montelukast (SINGULAIR) 10 mg tablet; Take 1 Tablet by mouth nightly. Other orders  -     Leg Brace (Ankle Brace) misc; by Does Not Apply route. Right ankle as needed for pain  -     norgestimate-ethinyl estradioL (ORTHO TRI-CYCLEN LO) 0.18/0.215/0.25 mg-25 mcg tab; Take 1 Tablet by mouth daily. -     inhalational spacing device (BreatheRite Valved MDI Chamber); 1 Each by Does Not Apply route as needed for Wheezing.  -     cetirizine (ZYRTEC) 10 mg tablet; Take 1 Tablet by mouth daily as needed for Allergies. Follow-up and Dispositions    · Return in about 4 weeks (around 4/29/2022) for irregular menses/allergies/asthma, virtual follow up. Prior to Admission medications    Medication Sig Start Date End Date Taking? Authorizing Provider   loratadine (CLARITIN REDITABS) 10 mg dissolvable tablet Take 1 Tablet by mouth daily. 4/14/22   Alvena Maxin, NP   Leg Brace (Ankle Brace) misc by Does Not Apply route.  Right ankle as needed for pain 4/1/22   Alvena Maxin, NP   norgestimate-ethinyl estradioL (ORTHO TRI-CYCLEN LO) 0.18/0.215/0.25 mg-25 mcg tab Take 1 Tablet by mouth daily. 4/1/22   Inge ARNOLD NP   albuterol (PROVENTIL HFA, VENTOLIN HFA, PROAIR HFA) 90 mcg/actuation inhaler Take 2 Puffs by inhalation every four (4) hours as needed for Wheezing, Shortness of Breath or Cough. 4/1/22   Maggy Barragan, NP   inhalational spacing device (BreatheRite Valved MDI Chamber) 1 Each by Does Not Apply route as needed for Wheezing. 4/1/22   Inge ARNOLD NP   montelukast (SINGULAIR) 10 mg tablet Take 1 Tablet by mouth nightly. 4/1/22   Inge ARNOLD NP   multivitamin (ONE A DAY) tablet Take 1 Tablet by mouth daily. Provider, Historical   ascorbic acid, vitamin C, (Vitamin C) 500 mg tablet Take  by mouth daily. Provider, Historical   cholecalciferol (Vitamin D3) 25 mcg (1,000 unit) cap Take  by mouth daily. Provider, Historical     There is no problem list on file for this patient. There are no problems to display for this patient. No Known Allergies  Past Medical History:   Diagnosis Date    Asthma      No past surgical history on file. Family History   Problem Relation Age of Onset    Hypertension Maternal Grandfather      Social History     Tobacco Use    Smoking status: Never Smoker    Smokeless tobacco: Never Used   Substance Use Topics    Alcohol use: Yes     Comment: socially        ROS    Objective:   No flowsheet data found. General: alert, cooperative, no distress   Mental  status: normal mood, behavior, speech, dress, motor activity, and thought processes, able to follow commands   HENT: NCAT   Neck: no visualized mass   Resp: no respiratory distress   Neuro: no gross deficits   Skin: no discoloration or lesions of concern on visible areas   Psychiatric: normal affect, consistent with stated mood, no evidence of hallucinations     Additional exam findings: We discussed the expected course, resolution and complications of the diagnosis(es) in detail.   Medication risks, benefits, costs, interactions, and alternatives were discussed as indicated. I advised her to contact the office if her condition worsens, changes or fails to improve as anticipated. She expressed understanding with the diagnosis(es) and plan. Edsonolayinkamarla Alvarez, was evaluated through a synchronous (real-time) audio-video encounter. The patient (or guardian if applicable) is aware that this is a billable service, which includes applicable co-pays. Verbal consent to proceed has been obtained. The visit was conducted pursuant to the emergency declaration under the [facility], [address] [place] [address] authority and the [employer] General Act. Patient identification was verified, and a caregiver was present when appropriate. The patient was located at home in a state where the provider was licensed to provide care.     Yuli Winkler NP

## 2022-06-01 DIAGNOSIS — R06.2 WHEEZING: ICD-10-CM

## 2022-06-01 DIAGNOSIS — R06.02 SOB (SHORTNESS OF BREATH): ICD-10-CM

## 2022-06-01 DIAGNOSIS — R05.9 COUGH: ICD-10-CM

## 2022-06-01 RX ORDER — MONTELUKAST SODIUM 10 MG/1
10 TABLET ORAL
Qty: 90 TABLET | Refills: 2 | OUTPATIENT
Start: 2022-06-01

## 2022-06-01 RX ORDER — LORATADINE 10 MG
10 TABLET,DISINTEGRATING ORAL DAILY
Qty: 90 TABLET | Refills: 1 | OUTPATIENT
Start: 2022-06-01

## 2022-06-01 RX ORDER — ALBUTEROL SULFATE 90 UG/1
2 AEROSOL, METERED RESPIRATORY (INHALATION)
Qty: 18 G | Refills: 3 | OUTPATIENT
Start: 2022-06-01

## 2022-06-01 NOTE — TELEPHONE ENCOUNTER
Claritin was sent on 4/14/22 for #90 with 1 refill  Singulair was sent on 4/14/22 for #90 with 2 refills  Albuterol was sent on 4/14/22 for #1 with 3 refills    For Pharmacy [address] in place:    Recommendation Provided To:    Intervention Detail: New Rx: 3, reason: Patient Preference   Gap Closed?:    Intervention Accepted By:   Kerrie Tanner Time Spent (min): 5

## 2022-06-13 ENCOUNTER — CLINICAL SUPPORT (OUTPATIENT)
Dept: FAMILY MEDICINE CLINIC | Age: 27
End: 2022-06-13

## 2022-06-13 DIAGNOSIS — Z11.1 PPD SCREENING TEST: Primary | ICD-10-CM

## 2022-06-13 NOTE — PROGRESS NOTES
Patient came in to clinic today for ppd placement. PPD placed on patient's left forearm 0.1ml. Patient tolerated well without adverse reactions. Patient told to return to clinic in two days for PPD Reading. Tuberculin Purified Protein Derivative (Mantoux) Tubersol.   Lot Number: [id number]  Expiration Date: 6/17/2023  RyanJosselyn Bandadariontaylor 47: [id number]

## 2022-06-15 ENCOUNTER — OFFICE VISIT (OUTPATIENT)
Dept: FAMILY MEDICINE CLINIC | Age: 27
End: 2022-06-15

## 2022-06-15 DIAGNOSIS — Z11.1 ENCOUNTER FOR PPD SKIN TEST READING: Primary | ICD-10-CM

## 2022-06-15 NOTE — PROGRESS NOTES
Patient came in today for TB reading from PPD placement on 13 June 2022. Patient is well nourished, well groomed, A&Ox3. Patients left forearm where the positive protein derivative was placed does show bruising. There is no fever at the sight, no pain, no raised skin, and no swelling. Patients test result is negative. Patient does not need to be retested again unless exposed or it is deemed necessary by physician, job, or schooling. Results were explained to the patient and all questions were answered. Documentation of this visit was presented to patient at the end of discussion.

## 2022-12-08 ENCOUNTER — VIRTUAL VISIT (OUTPATIENT)
Dept: FAMILY MEDICINE CLINIC | Age: 27
End: 2022-12-08
Payer: COMMERCIAL

## 2022-12-08 DIAGNOSIS — R51.9 NONINTRACTABLE HEADACHE, UNSPECIFIED CHRONICITY PATTERN, UNSPECIFIED HEADACHE TYPE: ICD-10-CM

## 2022-12-08 DIAGNOSIS — L30.9 DERMATITIS: Primary | ICD-10-CM

## 2022-12-08 PROCEDURE — 99213 OFFICE O/P EST LOW 20 MIN: CPT | Performed by: NURSE PRACTITIONER

## 2022-12-08 RX ORDER — BUTALBITAL, ACETAMINOPHEN AND CAFFEINE 50; 325; 40 MG/1; MG/1; MG/1
1 TABLET ORAL
Qty: 40 TABLET | Refills: 1 | Status: SHIPPED | OUTPATIENT
Start: 2022-12-08

## 2022-12-08 RX ORDER — TRIAMCINOLONE ACETONIDE 5 MG/G
CREAM TOPICAL 2 TIMES DAILY
Qty: 30 G | Refills: 2 | Status: SHIPPED | OUTPATIENT
Start: 2022-12-08

## 2022-12-08 NOTE — PROGRESS NOTES
Gissell Schuster (: 1995) is a 24 y.o. female, established patient, here for evaluation of the following chief complaint(s):   Rash (On neck since October)       d a caregiver was present when appropriate. The patient was located at: Home: [facility] [address]  The provider was located at: Facility (Appt Department): [address]  [address] [facility]       An [address] [facility] was used to authenticate this note.   -- Melissa Toledo

## 2022-12-08 NOTE — PROGRESS NOTES
Alcide Libman is a 24 y.o. female who was seen by synchronous (real-time) audio-video technology on 12/8/2022 for Rash (On neck since October)    Assessment & Plan:   Diagnoses and all orders for this visit:    1. Dermatitis    2. Nonintractable headache, unspecified chronicity pattern, unspecified headache type    Other orders  -     triamcinolone (ARISTOCORT) 0.5 % topical cream; Apply  to affected area two (2) times a day. use thin layer  -     butalbital-acetaminophen-caffeine (FIORICET, ESGIC) -40 mg per tablet; Take 1 Tablet by mouth every six (6) hours as needed for Headache. Begin triamcinolone cream as above for dermatitis. If no improvement consider referral to dermatology. Instructed to begin keeping headache diary over the next 2 weeks. Fioricet as above as needed for headache. 712  Subjective:   Patient states she has been experiencing a rash on neck, described as erythematous and bumpy. Patient reports rash first appeared was \"angry appearing\". Reports she did try hydrocortisone cream however this was not effective. States rash seems to worsen itching increase with the stress of her finals. Reports she is over Thanksgiving she did try her father's Cerave and liquid steroid with some improvement. Headache: Reports she has been experiencing headaches approximately once every 1 to 2 weeks her headaches have improved with Excedrin however over the last 4 to 5 months this has not been effective. Describes headaches as sharp typically on the sides of her head and aching behind her eyes. Headaches either began when it is raining or if headache is already present is worse with rain. Prior to Admission medications    Medication Sig Start Date End Date Taking? Authorizing Provider   loratadine (CLARITIN REDITABS) 10 mg dissolvable tablet Take 1 Tablet by mouth daily.  4/14/22  Yes Jenene Salts, NP   norgestimate-ethinyl estradioL (ORTHO TRI-CYCLEN LO) 0.18/0.215/0.25 mg-25 mcg tab Take 1 Tablet by mouth daily. 4/1/22  Yes Grace Roberts NP   albuterol (PROVENTIL HFA, VENTOLIN HFA, PROAIR HFA) 90 mcg/actuation inhaler Take 2 Puffs by inhalation every four (4) hours as needed for Wheezing, Shortness of Breath or Cough. 4/1/22  Yes Grace Roberts NP   inhalational spacing device (BreatheRite Valved MDI Chamber) 1 Each by Does Not Apply route as needed for Wheezing. 4/1/22  Yes Delwin Fothergill T, NP   montelukast (SINGULAIR) 10 mg tablet Take 1 Tablet by mouth nightly. 4/1/22  Yes Grace Roberts NP   multivitamin (ONE A DAY) tablet Take 1 Tablet by mouth daily. Yes Provider, Historical   ascorbic acid, vitamin C, (VITAMIN C) 500 mg tablet Take  by mouth daily. Yes Provider, Historical   cholecalciferol (VITAMIN D3) 25 mcg (1,000 unit) cap Take  by mouth daily. Yes Provider, Historical   Leg Brace (Ankle Brace) misc by Does Not Apply route. Right ankle as needed for pain 4/1/22   Grace Roberts NP     There is no problem list on file for this patient. There are no problems to display for this patient. Current Outpatient Medications   Medication Sig Dispense Refill    loratadine (CLARITIN REDITABS) 10 mg dissolvable tablet Take 1 Tablet by mouth daily. 90 Tablet 1    norgestimate-ethinyl estradioL (ORTHO TRI-CYCLEN LO) 0.18/0.215/0.25 mg-25 mcg tab Take 1 Tablet by mouth daily. 3 Each 2    albuterol (PROVENTIL HFA, VENTOLIN HFA, PROAIR HFA) 90 mcg/actuation inhaler Take 2 Puffs by inhalation every four (4) hours as needed for Wheezing, Shortness of Breath or Cough. 18 g 3    inhalational spacing device (BreatheRite Valved MDI Chamber) 1 Each by Does Not Apply route as needed for Wheezing. 1 Each 0    montelukast (SINGULAIR) 10 mg tablet Take 1 Tablet by mouth nightly. 90 Tablet 2    multivitamin (ONE A DAY) tablet Take 1 Tablet by mouth daily. ascorbic acid, vitamin C, (VITAMIN C) 500 mg tablet Take  by mouth daily.       cholecalciferol (VITAMIN D3) 25 mcg (1,000 unit) cap Take  by mouth daily. Leg Brace (Ankle Brace) misc by Does Not Apply route. Right ankle as needed for pain 1 Each 0     No Known Allergies  Past Medical History:   Diagnosis Date    Asthma      History reviewed. No pertinent surgical history. Family History   Problem Relation Age of Onset    Hypertension Maternal Grandfather      Social History     Tobacco Use    Smoking status: Never    Smokeless tobacco: Never   Substance Use Topics    Alcohol use: Yes     Comment: socially      ROS    Objective:     Patient-Reported Vitals 12/7/2022   Patient-Reported LMP 11/28      General: alert, cooperative, no distress   Mental  status: normal mood, behavior, speech, dress, motor activity, and thought processes, able to follow commands   HENT: NCAT   Neck: no visualized mass   Resp: no respiratory distress   Neuro: no gross deficits   Skin: no discoloration or lesions of concern on visible areas   Psychiatric: normal affect, consistent with stated mood, no evidence of hallucinations     Additional exam findings: We discussed the expected course, resolution and complications of the diagnosis(es) in detail. Medication risks, benefits, costs, interactions, and alternatives were discussed as indicated. I advised her to contact the office if her condition worsens, changes or fails to improve as anticipated. She expressed understanding with the diagnosis(es) and plan. Shareeara Keltonaparna, was evaluated through a synchronous (real-time) audio-video encounter. The patient (or guardian if applicable) is aware that this is a billable service, which includes applicable co-pays. This Virtual Visit was conducted with patient's (and/or legal guardian's) consent. The visit was conducted pursuant to the emergency declaration under the [address] [place] [address] authority and the [employer] General Act.   Patient identification was verified, and a caregiver was present when appropriate. The patient was located at: Home: [address]  The provider was located at:  Facility (Appt Department): [address] [facility]  [address] [facility] [address] [facility]

## 2023-03-09 ENCOUNTER — TELEMEDICINE (OUTPATIENT)
Age: 28
End: 2023-03-09
Payer: MEDICAID

## 2023-03-09 DIAGNOSIS — R51.9 INTRACTABLE HEADACHE, UNSPECIFIED CHRONICITY PATTERN, UNSPECIFIED HEADACHE TYPE: Primary | ICD-10-CM

## 2023-03-09 DIAGNOSIS — F41.8 TEST ANXIETY: ICD-10-CM

## 2023-03-09 PROCEDURE — 99214 OFFICE O/P EST MOD 30 MIN: CPT | Performed by: NURSE PRACTITIONER

## 2023-03-09 PROCEDURE — 99211 OFF/OP EST MAY X REQ PHY/QHP: CPT

## 2023-03-09 RX ORDER — PROPRANOLOL HYDROCHLORIDE 10 MG/1
TABLET ORAL
Qty: 60 TABLET | Refills: 3 | Status: SHIPPED | OUTPATIENT
Start: 2023-03-09

## 2023-03-09 RX ORDER — ONDANSETRON 4 MG/1
4 TABLET, ORALLY DISINTEGRATING ORAL EVERY 8 HOURS PRN
Qty: 21 TABLET | Refills: 0 | Status: SHIPPED | OUTPATIENT
Start: 2023-03-09

## 2023-03-09 SDOH — ECONOMIC STABILITY: HOUSING INSECURITY
IN THE LAST 12 MONTHS, WAS THERE A TIME WHEN YOU DID NOT HAVE A STEADY PLACE TO SLEEP OR SLEPT IN A SHELTER (INCLUDING NOW)?: NO

## 2023-03-09 SDOH — ECONOMIC STABILITY: FOOD INSECURITY: WITHIN THE PAST 12 MONTHS, THE FOOD YOU BOUGHT JUST DIDN'T LAST AND YOU DIDN'T HAVE MONEY TO GET MORE.: NEVER TRUE

## 2023-03-09 SDOH — ECONOMIC STABILITY: INCOME INSECURITY: HOW HARD IS IT FOR YOU TO PAY FOR THE VERY BASICS LIKE FOOD, HOUSING, MEDICAL CARE, AND HEATING?: NOT HARD AT ALL

## 2023-03-09 SDOH — ECONOMIC STABILITY: FOOD INSECURITY: WITHIN THE PAST 12 MONTHS, YOU WORRIED THAT YOUR FOOD WOULD RUN OUT BEFORE YOU GOT MONEY TO BUY MORE.: NEVER TRUE

## 2023-03-09 ASSESSMENT — PATIENT HEALTH QUESTIONNAIRE - PHQ9
1. LITTLE INTEREST OR PLEASURE IN DOING THINGS: 0
SUM OF ALL RESPONSES TO PHQ QUESTIONS 1-9: 0
SUM OF ALL RESPONSES TO PHQ9 QUESTIONS 1 & 2: 0
2. FEELING DOWN, DEPRESSED OR HOPELESS: 0
SUM OF ALL RESPONSES TO PHQ QUESTIONS 1-9: 0

## 2023-03-09 NOTE — PROGRESS NOTES
1. \"Have you been to the ER, urgent care clinic since your last visit? Hospitalized since your last visit? \" No    2. \"Have you seen or consulted any other health care providers outside of the [address] [place] [address] since your last visit? \" No     3. For patients aged 39-70: Has the patient had a colonoscopy / FIT/ Cologuard? NA - based on age      If the patient is female:    4. For patients aged 41-77: Has the patient had a mammogram within the past 2 years? NA - based on age or sex      11. For patients aged 21-65: Has the patient had a pap smear? Yes - Care Gap present.  Most recent result on file

## 2023-03-09 NOTE — PROGRESS NOTES
Tried calling patient at 7:37 to begin visit with BERNIE Schuster but patient's phone number says the service you are trying to use is restricted.

## 2023-03-09 NOTE — PROGRESS NOTES
Carin Mattson is a 34 y.o. (1995) female is a Established patient, who was seen by synchronous (real-time) audio-video technology on 3/9/2023 for evaluation of the following:   Chief Complaint   Patient presents with    Headache    Nausea & Vomiting      Assessment & Plan:   Jaida Ny was seen today for headache, nausea & vomiting and emesis. Diagnoses and all orders for this visit:    Intractable headache, unspecified chronicity pattern, unspecified headache type  -     External Referral To Neurology  -     CT HEAD WO CONTRAST; Future    Test anxiety    Other orders  -     propranolol (INDERAL) 10 MG tablet; Take 1-2 tabs po at bedtime before exam, may repeat 1-2 tab morning of exam  -     ondansetron (ZOFRAN-ODT) 4 MG disintegrating tablet; Take 1 tablet by mouth every 8 hours as needed for Nausea or Vomiting    Advised to upload headache dairy to [employer]    Return in about 4 weeks (around 4/6/2023), or if symptoms worsen or fail to improve, for headache/test anxiety, telemedicine MyChart. Subjective:   Patient states headaches have increased in frequency but not intensity since January. States she feels like she is having 1-2 headaches per week. Comments she has taken fioricet with moderate relief however, the headache still remains sometimes until the next day. She is able to function with the milder headache. Severe 1/26/2023 and very severe 2/9/2023. Has taken 2-3 times per month, didn't take in Feb.  States her worse headache   States at times she feels like the headache is not 'bad' enough to take medication. Reports she will 'wait it out' ie: eating, massage head, lay down if she is feeling tired, or may take an excedrin. Denies vomiting with headaches. Admits to photophobia at times, denies phonophobia. Denies aura. Vomiting: reports she has test anxiety.   Comments she has noticed this semester she is unable to eat the day of her exam.  Comments the early morning of her last exam she work

## 2023-04-03 ENCOUNTER — HOSPITAL ENCOUNTER (OUTPATIENT)
Facility: HOSPITAL | Age: 28
Discharge: HOME | End: 2023-04-06
Payer: MEDICAID

## 2023-04-03 DIAGNOSIS — R51.9 INTRACTABLE HEADACHE, UNSPECIFIED CHRONICITY PATTERN, UNSPECIFIED HEADACHE TYPE: ICD-10-CM

## 2023-04-03 PROCEDURE — 70450 CT HEAD/BRAIN W/O DYE: CPT

## 2023-04-07 ENCOUNTER — TELEMEDICINE (OUTPATIENT)
Age: 28
End: 2023-04-07
Payer: MEDICAID

## 2023-04-07 DIAGNOSIS — R51.9 INTRACTABLE HEADACHE, UNSPECIFIED CHRONICITY PATTERN, UNSPECIFIED HEADACHE TYPE: Primary | ICD-10-CM

## 2023-04-07 PROCEDURE — 99213 OFFICE O/P EST LOW 20 MIN: CPT | Performed by: NURSE PRACTITIONER

## 2023-04-07 PROCEDURE — 99211 OFF/OP EST MAY X REQ PHY/QHP: CPT

## 2023-09-20 ENCOUNTER — TELEMEDICINE (OUTPATIENT)
Age: 28
End: 2023-09-20
Payer: MEDICAID

## 2023-09-20 DIAGNOSIS — F41.9 MODERATE ANXIETY: Primary | ICD-10-CM

## 2023-09-20 PROCEDURE — 99213 OFFICE O/P EST LOW 20 MIN: CPT | Performed by: NURSE PRACTITIONER

## 2023-09-20 RX ORDER — BUSPIRONE HYDROCHLORIDE 5 MG/1
TABLET ORAL
Qty: 90 TABLET | Refills: 3 | Status: SHIPPED | OUTPATIENT
Start: 2023-09-20

## 2023-09-20 NOTE — PROGRESS NOTES
mouth daily    Ar Automatic Reconciliation   butalbital-acetaminophen-caffeine (FIORICET, ESGIC) -40 MG per tablet Take 1 tablet by mouth every 6 hours as needed 12/8/22   Ar Automatic Reconciliation   vitamin D 25 MCG (1000 UT) CAPS Take by mouth daily    Ar Automatic Reconciliation   montelukast (SINGULAIR) 10 MG tablet Take 10 mg by mouth 4/1/22   Ar Automatic Reconciliation   Norgestim-Eth Estrad Triphasic 0.18/0.215/0.25 MG-25 MCG TABS Take 1 tablet by mouth daily 4/1/22   Ar Automatic Reconciliation   triamcinolone (ARISTOCORT) 0.5 % cream Apply topically 2 times daily 12/8/22   Ar Automatic Reconciliation     No Known Allergies    Review of Systems    Objective: There were no vitals taken for this visit. General: alert, cooperative, no distress   Mental  status: normal mood, behavior, speech, dress, motor activity, and thought processes, able to follow commands   HENT: NCAT   Neck: no visualized mass   Resp: no respiratory distress   Neuro: no gross deficits   Skin: no discoloration or lesions of concern on visible areas   Psychiatric: normal affect, consistent with stated mood, no evidence of hallucinations     Additional exam findings: We discussed the expected course, resolution and complications of the diagnosis(es) in detail. Medication risks, benefits, costs, interactions, and alternatives were discussed as indicated. I advised her to contact the office if her condition worsens, changes or fails to improve as anticipated. She expressed understanding with the diagnosis(es) and plan. Consent: Sheila Azar, who was seen by synchronous (real-time) audio-video technology, and/or her healthcare decision maker, is aware that this patient-initiated, Telehealth encounter on 9/20/2023 is a billable service, with coverage as determined by her insurance carrier.  She is aware that she may receive a bill and has provided verbal consent to proceed: yes    Sheila Azar, was evaluated through a

## 2023-10-18 ENCOUNTER — TELEMEDICINE (OUTPATIENT)
Age: 28
End: 2023-10-18
Payer: MEDICAID

## 2023-10-18 DIAGNOSIS — F41.9 MODERATE ANXIETY: Primary | ICD-10-CM

## 2023-10-18 PROCEDURE — 99213 OFFICE O/P EST LOW 20 MIN: CPT | Performed by: NURSE PRACTITIONER

## 2023-10-18 NOTE — PROGRESS NOTES
Neuro: no gross deficits   Skin: no discoloration or lesions of concern on visible areas   Psychiatric: normal affect, consistent with stated mood, no evidence of hallucinations     Additional exam findings: We discussed the expected course, resolution and complications of the diagnosis(es) in detail. Medication risks, benefits, costs, interactions, and alternatives were discussed as indicated. I advised her to contact the office if her condition worsens, changes or fails to improve as anticipated. She expressed understanding with the diagnosis(es) and plan. Consent: Letha Rowan, who was seen by synchronous (real-time) audio-video technology, and/or her healthcare decision maker, is aware that this patient-initiated, Telehealth encounter on 10/18/2023 is a billable service, with coverage as determined by her insurance carrier. She is aware that she may receive a bill and has provided verbal consent to proceed: yes    Letha Rowan, was evaluated through a synchronous (real-time) audio-video encounter. The patient (or guardian if applicable) is aware that this is a billable service, which includes applicable co-pays. This Virtual Visit was conducted with patient's (and/or legal guardian's) consent. The visit was conducted pursuant to the emergency declaration under the [address] [facility] authority and the [employer] and [employer] General Act. Patient identification was verified, and a caregiver was present when appropriate. The patient was located at Home: [address] [facility]  Provider was located at [facility] (Appt Dept): [address] [facility], [address] [facility],  [address] [facility] APRFABBY - NP  An electronic signature was used to authenticate this note.

## 2024-01-04 NOTE — TELEPHONE ENCOUNTER
Last Visit: 10- VV   Next Appointment: none  Previous Refill Encounter: 01- #84 tabs with 3 refills      Requested Prescriptions     Pending Prescriptions Disp Refills    TRI-LO-CHASTITY 0.18/0.215/0.25 MG-25 MCG TABS [Pharmacy Med Name: Tri-Lo-Chastity 0.18/0.215/0.25 MG-25 MCG Oral Tablet] 84 tablet 0     Sig: Take 1 tablet by mouth once daily     .

## 2024-01-08 RX ORDER — NORGESTIMATE AND ETHINYL ESTRADIOL 7DAYSX3 LO
1 KIT ORAL DAILY
Qty: 84 TABLET | Refills: 3 | Status: SHIPPED | OUTPATIENT
Start: 2024-01-08

## 2024-01-16 ENCOUNTER — TELEMEDICINE (OUTPATIENT)
Age: 29
End: 2024-01-16
Payer: MEDICAID

## 2024-01-16 DIAGNOSIS — L30.9 DERMATITIS, UNSPECIFIED: Primary | ICD-10-CM

## 2024-01-16 PROCEDURE — 99213 OFFICE O/P EST LOW 20 MIN: CPT | Performed by: NURSE PRACTITIONER

## 2024-01-16 RX ORDER — FEXOFENADINE HCL 180 MG/1
180 TABLET ORAL DAILY
Qty: 100 TABLET | Refills: 3 | Status: SHIPPED | OUTPATIENT
Start: 2024-01-16

## 2024-01-16 NOTE — PROGRESS NOTES
Ludivina Martell is a 28 y.o. (1995) female is a Established patient, who was seen by synchronous (real-time) audio-video technology on 1/16/2024 for evaluation of the following:   Chief Complaint   Patient presents with    Rash      Assessment & Plan:   Ludivina was seen today for rash.    Diagnoses and all orders for this visit:    Dermatitis, unspecified    Other orders  -     fexofenadine (ALLEGRA) 180 MG tablet; Take 1 tablet by mouth daily      Hold propranolol due to remainder of the week and send MyChart with update of urticaria status  If no resolution with holding propranolol will refer to allergist in local area.  Patient agreeable with plan.    Subjective:   Patient reports 1-2 days after [place] she developed a rash and thought it was related to the Ugly [place] sweater she wore.  Comments rash began on back and then noticed on chest, radiating to neck, face(chin, cheeks, eyebrows) and hands.  Patient tried previously prescribed triamcinolone however, this burned.  Denies changes in soaps, lotions, detergents.  Reports she may go 1-2 days without seeing the rash.  She has been taking Allegra with improvement.  However, she has noticed 'breakthrough rash' 1-2 times per week.  Describes rash as flat, itchy and at times feels tingling/burning.    Prior to Admission medications    Medication Sig Start Date End Date Taking? Authorizing Provider   Norgestim-Eth Estrad Triphasic (TRI-LO-CHASTITY) 0.18/0.215/0.25 MG-25 MCG TABS Take 1 tablet by mouth once daily 1/8/24   Mily Sy APRN - NP   busPIRone (BUSPAR) 5 MG tablet Take 1 tab po breakfast and dinner, may take 1 po with lunch if needed 9/20/23   Mily Sy APRN - NP   propranolol (INDERAL) 10 MG tablet Take 1-2 tabs po at bedtime before exam, may repeat 1-2 tab morning of exam 3/9/23   Mily Sy APRN - NP   ondansetron (ZOFRAN-ODT) 4 MG disintegrating tablet Take 1 tablet by mouth every 8 hours as needed for Nausea or Vomiting 3/9/23

## 2024-01-29 NOTE — TELEPHONE ENCOUNTER
Last appointment: 01/16/2024    Next appointment: none    Pharmacy requesting refill for     propranolol (INDERAL) 10 MG tablet [[id number]]    Pharmacy     [facility]/pharmacy #[id number] - [place], [place] - [address] [place] [address] [phone] - F [phone] (Pharmacy)

## 2024-02-01 RX ORDER — PROPRANOLOL HYDROCHLORIDE 10 MG/1
TABLET ORAL
Qty: 60 TABLET | Refills: 3 | Status: SHIPPED | OUTPATIENT
Start: 2024-02-01

## 2024-07-31 ENCOUNTER — TELEMEDICINE (OUTPATIENT)
Facility: CLINIC | Age: 29
End: 2024-07-31
Payer: MEDICAID

## 2024-07-31 ENCOUNTER — TELEPHONE (OUTPATIENT)
Facility: CLINIC | Age: 29
End: 2024-07-31

## 2024-07-31 DIAGNOSIS — F41.9 MODERATE ANXIETY: ICD-10-CM

## 2024-07-31 DIAGNOSIS — F32.A MODERATELY SEVERE DEPRESSION: Primary | ICD-10-CM

## 2024-07-31 PROCEDURE — 99214 OFFICE O/P EST MOD 30 MIN: CPT | Performed by: NURSE PRACTITIONER

## 2024-07-31 RX ORDER — BUSPIRONE HYDROCHLORIDE 5 MG/1
TABLET ORAL
Qty: 90 TABLET | Refills: 3 | Status: SHIPPED | OUTPATIENT
Start: 2024-07-31

## 2024-07-31 RX ORDER — ESCITALOPRAM OXALATE 10 MG/1
TABLET ORAL
Qty: 30 TABLET | Refills: 3 | Status: SHIPPED | OUTPATIENT
Start: 2024-07-31 | End: 2024-10-07

## 2024-07-31 SDOH — ECONOMIC STABILITY: FOOD INSECURITY: WITHIN THE PAST 12 MONTHS, THE FOOD YOU BOUGHT JUST DIDN'T LAST AND YOU DIDN'T HAVE MONEY TO GET MORE.: NEVER TRUE

## 2024-07-31 SDOH — ECONOMIC STABILITY: FOOD INSECURITY: WITHIN THE PAST 12 MONTHS, YOU WORRIED THAT YOUR FOOD WOULD RUN OUT BEFORE YOU GOT MONEY TO BUY MORE.: NEVER TRUE

## 2024-07-31 SDOH — ECONOMIC STABILITY: INCOME INSECURITY: HOW HARD IS IT FOR YOU TO PAY FOR THE VERY BASICS LIKE FOOD, HOUSING, MEDICAL CARE, AND HEATING?: NOT HARD AT ALL

## 2024-07-31 NOTE — PROGRESS NOTES
Ludivina Martell is a 29 y.o. (1995) female is a Established patient, who was seen by synchronous (real-time) audio-video technology on 7/31/2024 for evaluation of the following:   Chief Complaint   Patient presents with    Anxiety      Assessment & Plan:   Ludivina was seen today for anxiety.    Diagnoses and all orders for this visit:    Moderately severe depression    Moderate anxiety    Other orders  -     escitalopram (LEXAPRO) 10 MG tablet; Take 0.5 tablets by mouth daily for 8 days, THEN 1 tablet daily.      Begin lexapro as above    Return in about 3 weeks (around 8/21/2024) for anxiety/depression, telemedicine MyChart.    Subjective:     History of Present Illness  The patient presents via virtual visit for evaluation of anxiety and depression.    She reports experiencing heightened anxiety and depressive symptoms, which she attributes to the stress of preparing for a board exam. Despite passing the exam on her second attempt, she continues to experience significant worry. Over the past two weeks, her symptoms have intensified, leading to an emotional breakdown on Monday night. She considered dropping out of school, but her parents provided support and she is now currently back home with them for now.    She struggles with morning fatigue, often finding it difficult to start her day. Her sleep patterns are inconsistent, with periods of excessive sleep and other times of feeling unrested upon waking. She does not experience awakenings at night due to anxiety. She has been more emotional recently, crying more frequently than usual.    She has developed avoidant behaviors, such as not attempting certain practice questions due to fear of failure. 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 She believes that being around her family and having her dog for emotional support will be beneficial.    She is currently enrolled in a

## 2024-07-31 NOTE — TELEPHONE ENCOUNTER
Left patient voicemail to contact office to schedule follow up appointment.     Return in about 3 weeks (around 8/21/2024) for anxiety/depression, telemedicine MyChart.

## 2024-07-31 NOTE — PROGRESS NOTES
Ludivina Martell, was evaluated through a synchronous (real-time) audio-video encounter. The patient (or guardian if applicable) is aware that this is a billable service, which includes applicable co-pays. This Virtual Visit was conducted with patient's (and/or legal guardian's) consent. Patient identification was verified, and a caregiver was present when appropriate.   The patient was located at Home: [address] [place] [address]  Provider was located at Facility (Appt Dept): [address]  [place] [address]  Confirm you are appropriately licensed, registered, or certified to deliver care in the [facility] where the patient is located as indicated above. If you are not or unsure, please re-schedule the visit: Yes, I confirm.     Ludivina Martell (:  1995) is a Established patient, presenting virtually for evaluation of the following:      --Fadia Carmona

## 2024-08-21 ENCOUNTER — TELEPHONE (OUTPATIENT)
Facility: CLINIC | Age: 29
End: 2024-08-21

## 2024-08-21 ENCOUNTER — TELEMEDICINE (OUTPATIENT)
Facility: CLINIC | Age: 29
End: 2024-08-21
Payer: MEDICAID

## 2024-08-21 DIAGNOSIS — F32.A ANXIETY AND DEPRESSION: Primary | ICD-10-CM

## 2024-08-21 DIAGNOSIS — F41.9 ANXIETY AND DEPRESSION: Primary | ICD-10-CM

## 2024-08-21 PROCEDURE — 99214 OFFICE O/P EST MOD 30 MIN: CPT | Performed by: NURSE PRACTITIONER

## 2024-08-21 NOTE — PROGRESS NOTES
Ludivina Martell is a 29 y.o. (1995) female is a Established patient, who was seen by synchronous (real-time) audio-video technology on 8/21/2024 for evaluation of the following:   Chief Complaint   Patient presents with    Anxiety    Depression      Assessment & Plan:     Assessment & Plan  1. Anxiety.  She has been on Lexapro 10 mg for 3 weeks, with the understanding that it typically takes about 6 weeks for the medication to reach its full therapeutic effect. She reports improvement but still experiences some difficult days. She was advised to continue with her current regimen of Lexapro 10 mg and propranolol. She was also given the option to take BuSpar 5 mg twice daily to help manage her thoughts and anxiety symptoms. She will inform if there is a need to increase the dosage of Lexapro after another month.    Follow-up  A follow-up appointment is scheduled for 4 weeks from now.      Ludivina was seen today for anxiety and depression.    Diagnoses and all orders for this visit:    Anxiety and depression    Above improving with Lexapro continue current medication regimen    Return in about 4 weeks (around 9/18/2024) for anxiety/depression, telemedicine MyChart.    Subjective:     History of Present Illness  The patient presents via virtual visit for anxiety depression follow-up.      She reports an improvement in her anxiety symptoms since starting Lexapro, which she is taking in conjunction with propranolol. Initially, she did not perceive any benefits from Lexapro, but over time, she has noticed a positive change. Her symptoms vary, with some days being more challenging than others.    She has a query regarding the daily intake of propranolol. She takes one tablet of propranolol, and if she experiences anxious symptoms later in the day, she takes another. She believes that propranolol helps with the physical symptoms of her anxiety, such as heart palpitations and nausea.    She has also started taking BuSpar in

## 2024-08-21 NOTE — TELEPHONE ENCOUNTER
Left patient voicemail to contact office to schedule follow up appointment.       Return in about 4 weeks (around 9/18/2024) for anxiety/depression, telemedicine MyChart.

## 2024-09-19 ENCOUNTER — TELEMEDICINE (OUTPATIENT)
Facility: CLINIC | Age: 29
End: 2024-09-19
Payer: MEDICAID

## 2024-09-19 DIAGNOSIS — F32.A ANXIETY AND DEPRESSION: Primary | ICD-10-CM

## 2024-09-19 DIAGNOSIS — F41.9 ANXIETY AND DEPRESSION: Primary | ICD-10-CM

## 2024-09-19 PROCEDURE — 99214 OFFICE O/P EST MOD 30 MIN: CPT | Performed by: NURSE PRACTITIONER

## 2024-09-19 RX ORDER — BUSPIRONE HYDROCHLORIDE 15 MG/1
15 TABLET ORAL 3 TIMES DAILY PRN
Qty: 90 TABLET | Refills: 1 | Status: SHIPPED | OUTPATIENT
Start: 2024-09-19

## 2024-09-19 RX ORDER — ESCITALOPRAM OXALATE 20 MG/1
20 TABLET ORAL DAILY
Qty: 30 TABLET | Refills: 3 | Status: SHIPPED | OUTPATIENT
Start: 2024-09-19

## 2024-10-21 ENCOUNTER — TELEMEDICINE (OUTPATIENT)
Facility: CLINIC | Age: 29
End: 2024-10-21
Payer: MEDICAID

## 2024-10-21 DIAGNOSIS — Z13.83 SCREENING FOR CARDIOVASCULAR, RESPIRATORY, AND GENITOURINARY DISEASES: ICD-10-CM

## 2024-10-21 DIAGNOSIS — F41.9 ANXIETY AND DEPRESSION: Primary | ICD-10-CM

## 2024-10-21 DIAGNOSIS — F32.A ANXIETY AND DEPRESSION: Primary | ICD-10-CM

## 2024-10-21 DIAGNOSIS — Z13.6 SCREENING FOR CARDIOVASCULAR, RESPIRATORY, AND GENITOURINARY DISEASES: ICD-10-CM

## 2024-10-21 DIAGNOSIS — Z13.89 SCREENING FOR CARDIOVASCULAR, RESPIRATORY, AND GENITOURINARY DISEASES: ICD-10-CM

## 2024-10-21 PROCEDURE — 99214 OFFICE O/P EST MOD 30 MIN: CPT | Performed by: NURSE PRACTITIONER

## 2024-10-21 NOTE — PROGRESS NOTES
Ludivina Martell, was evaluated through a synchronous (real-time) audio-video encounter. The patient (or guardian if applicable) is aware that this is a billable service, which includes applicable co-pays. This Virtual Visit was conducted with patient's (and/or legal guardian's) consent. Patient identification was verified, and a caregiver was present when appropriate.   The patient was located at Home: [address] [place] [id number]  Provider was located at Home (Appt Dept State): [place]  Confirm you are appropriately licensed, registered, or certified to deliver care in the state where the patient is located as indicated above. If you are not or unsure, please re-schedule the visit: Yes, I confirm.     Ludivina Martell (:  1995) is a Established patient, presenting virtually for evaluation of the following:      Below is the assessment and plan developed based on review of pertinent history, physical exam, labs, studies, and medications.         --Fadia Carmona         
as indicated above. If you are not or unsure, please re-schedule the visit: Yes, I confirm.        --CORRY Brush NP on 10/21/2024 at 7:49 AM    An electronic signature was used to authenticate this note.

## 2024-10-26 LAB
25(OH)D3+25(OH)D2 SERPL-MCNC: 30.9 NG/ML (ref 30–100)
ALBUMIN SERPL-MCNC: 3.9 G/DL (ref 4–5)
ALP SERPL-CCNC: 58 IU/L (ref 44–121)
ALT SERPL-CCNC: 9 IU/L (ref 0–32)
AST SERPL-CCNC: 15 IU/L (ref 0–40)
BASOPHILS # BLD AUTO: 0.1 X10E3/UL (ref 0–0.2)
BASOPHILS NFR BLD AUTO: 1 %
BILIRUB SERPL-MCNC: 0.3 MG/DL (ref 0–1.2)
BUN SERPL-MCNC: 9 MG/DL (ref 6–20)
BUN/CREAT SERPL: 11 (ref 9–23)
CALCIUM SERPL-MCNC: 9.6 MG/DL (ref 8.7–10.2)
CHLORIDE SERPL-SCNC: 102 MMOL/L (ref 96–106)
CHOLEST SERPL-MCNC: 227 MG/DL (ref 100–199)
CO2 SERPL-SCNC: 22 MMOL/L (ref 20–29)
CREAT SERPL-MCNC: 0.82 MG/DL (ref 0.57–1)
EGFRCR SERPLBLD CKD-EPI 2021: 100 ML/MIN/1.73
EOSINOPHIL # BLD AUTO: 0.2 X10E3/UL (ref 0–0.4)
EOSINOPHIL NFR BLD AUTO: 2 %
ERYTHROCYTE [DISTWIDTH] IN BLOOD BY AUTOMATED COUNT: 13.2 % (ref 11.7–15.4)
GLOBULIN SER CALC-MCNC: 2.9 G/DL (ref 1.5–4.5)
GLUCOSE SERPL-MCNC: 78 MG/DL (ref 70–99)
HBA1C MFR BLD: 5.3 % (ref 4.8–5.6)
HCT VFR BLD AUTO: 41.5 % (ref 34–46.6)
HDLC SERPL-MCNC: 73 MG/DL
HGB BLD-MCNC: 12.9 G/DL (ref 11.1–15.9)
IMM GRANULOCYTES # BLD AUTO: 0 X10E3/UL (ref 0–0.1)
IMM GRANULOCYTES NFR BLD AUTO: 0 %
LDLC SERPL CALC-MCNC: 138 MG/DL (ref 0–99)
LYMPHOCYTES # BLD AUTO: 4.3 X10E3/UL (ref 0.7–3.1)
LYMPHOCYTES NFR BLD AUTO: 52 %
MCH RBC QN AUTO: 26.5 PG (ref 26.6–33)
MCHC RBC AUTO-ENTMCNC: 31.1 G/DL (ref 31.5–35.7)
MCV RBC AUTO: 85 FL (ref 79–97)
MONOCYTES # BLD AUTO: 0.4 X10E3/UL (ref 0.1–0.9)
MONOCYTES NFR BLD AUTO: 5 %
NEUTROPHILS # BLD AUTO: 3.2 X10E3/UL (ref 1.4–7)
NEUTROPHILS NFR BLD AUTO: 40 %
PLATELET # BLD AUTO: 426 X10E3/UL (ref 150–450)
POTASSIUM SERPL-SCNC: 4.8 MMOL/L (ref 3.5–5.2)
PROT SERPL-MCNC: 6.8 G/DL (ref 6–8.5)
RBC # BLD AUTO: 4.86 X10E6/UL (ref 3.77–5.28)
SODIUM SERPL-SCNC: 137 MMOL/L (ref 134–144)
T4 FREE SERPL-MCNC: 0.95 NG/DL (ref 0.82–1.77)
TRIGL SERPL-MCNC: 92 MG/DL (ref 0–149)
TSH SERPL DL<=0.005 MIU/L-ACNC: 4.88 UIU/ML (ref 0.45–4.5)
VLDLC SERPL CALC-MCNC: 16 MG/DL (ref 5–40)
WBC # BLD AUTO: 8.2 X10E3/UL (ref 3.4–10.8)

## 2024-10-31 ENCOUNTER — OFFICE VISIT (OUTPATIENT)
Facility: CLINIC | Age: 29
End: 2024-10-31

## 2024-10-31 VITALS
OXYGEN SATURATION: 98 % | SYSTOLIC BLOOD PRESSURE: 89 MMHG | HEIGHT: 63 IN | WEIGHT: 148.4 LBS | DIASTOLIC BLOOD PRESSURE: 56 MMHG | BODY MASS INDEX: 26.29 KG/M2 | TEMPERATURE: 98.2 F | HEART RATE: 77 BPM | RESPIRATION RATE: 18 BRPM

## 2024-10-31 DIAGNOSIS — Z01.419 ENCOUNTER FOR GYNECOLOGICAL EXAMINATION WITHOUT ABNORMAL FINDING: ICD-10-CM

## 2024-10-31 DIAGNOSIS — Z00.00 ROUTINE HISTORY AND PHYSICAL EXAMINATION OF ADULT: Primary | ICD-10-CM

## 2024-10-31 DIAGNOSIS — L50.9 URTICARIA, UNSPECIFIED: ICD-10-CM

## 2024-10-31 DIAGNOSIS — R79.89 ELEVATED TSH: ICD-10-CM

## 2024-10-31 DIAGNOSIS — Z12.4 SCREENING FOR MALIGNANT NEOPLASM OF CERVIX: ICD-10-CM

## 2024-10-31 DIAGNOSIS — D72.820 ELEVATED LYMPHOCYTES: ICD-10-CM

## 2024-10-31 RX ORDER — FAMOTIDINE 20 MG/1
20 TABLET, FILM COATED ORAL 2 TIMES DAILY PRN
Qty: 60 TABLET | Refills: 1 | Status: SHIPPED | OUTPATIENT
Start: 2024-10-31

## 2024-10-31 NOTE — PROGRESS NOTES
Subjective:      Ludivina Martell is a 29 y.o. female who presents for an annual exam.  History of Present Illness  The patient is a 29-year-old female who presents for an annual physical.    She reports experiencing anxiety, which she attributes to her upcoming exams. She had her first therapy appointment a few days ago and has another scheduled for 11/04/2024. Her therapist plans to try a technique called brain spotting. She is currently taking BuSpar, propranolol, and Lexapro for anxiety management.    She has noticed an increase in hives recently, despite not sweating. She reports no recent episodes of hives. She has not discontinued any of her medications to determine if they are the cause of her hives. She is also on birth control pills.    She has noticed a lump for the past 5 weeks, which she believes may be stress-related. She performs self-breast exams regularly.    She has a history of easy bruising and has noticed bruising return recently.    She reports no lightheadedness, dizziness, abnormal discharge, itching, lower abdominal pain, or menstrual cycle issues. She is not currently sexually active    She received her influenza vaccine on 10/11/2024.    The patient is not currently sexually active.   last pap: approximate date 4/26/2021 and was normal, last mammogram: patient has never had a mammogram, d/t age  Menstrual History:  OB History    No obstetric history on file.        Patient's last menstrual period was 10/06/2024.     Patient's medications, allergies, past medical, surgical, social and family histories were reviewed and updated as appropriate.    Review of Systems  Pertinent items are noted in HPI.      Objective:      BP (!) 89/56 (Site: Left Upper Arm, Position: Sitting, Cuff Size: Small Adult)   Pulse 77   Temp 98.2 °F (36.8 °C) (Temporal)   Resp 18   Ht 1.6 m (5' 3\")   Wt 67.3 kg (148 lb 6.4 oz)   LMP 10/06/2024   SpO2 98%   BMI 26.29 kg/m²     General appearance: alert, appears

## 2024-10-31 NOTE — PROGRESS NOTES
\"Have you been to the ER, urgent care clinic since your last visit?  Hospitalized since your last visit?\"    NO    “Have you seen or consulted any other health care providers outside our system since your last visit?”    NO     “Have you had a pap smear?”    NO    Date of last Cervical Cancer screen (HPV or PAP): 4/26/2021

## 2024-11-01 LAB — SPECIMEN STATUS REPORT: NORMAL

## 2024-11-04 NOTE — TELEPHONE ENCOUNTER
Last Appointment- 10/31/2    Next Appointment- 12/31/24    Previous Refill Encounter(s): Date: 1/8/24 #84 Refills 3    Requested Prescriptions     Pending Prescriptions Disp Refills    Norgestim-Eth Estrad Triphasic (TRI-LO-CHASTITY) 0.18/0.215/0.25 MG-25 MCG TABS 84 tablet 3     Sig: Take 1 tablet by mouth daily

## 2024-11-05 LAB
CYTOLOGIST CVX/VAG CYTO: NORMAL
CYTOLOGY CVX/VAG DOC CYTO: NORMAL
DX ICD CODE: NORMAL
Lab: NORMAL
OTHER STN SPEC: NORMAL
STAT OF ADQ CVX/VAG CYTO-IMP: NORMAL

## 2024-11-08 RX ORDER — NORGESTIMATE AND ETHINYL ESTRADIOL 7DAYSX3 LO
1 KIT ORAL DAILY
Qty: 84 TABLET | Refills: 3 | Status: SHIPPED | OUTPATIENT
Start: 2024-11-08

## 2024-12-25 LAB
BASOPHILS # BLD AUTO: 0.1 X10E3/UL (ref 0–0.2)
BASOPHILS NFR BLD AUTO: 1 %
EOSINOPHIL # BLD AUTO: 0.1 X10E3/UL (ref 0–0.4)
EOSINOPHIL NFR BLD AUTO: 1 %
ERYTHROCYTE [DISTWIDTH] IN BLOOD BY AUTOMATED COUNT: 12.7 % (ref 11.7–15.4)
HCT VFR BLD AUTO: 42.1 % (ref 34–46.6)
HGB BLD-MCNC: 13.4 G/DL (ref 11.1–15.9)
IMM GRANULOCYTES # BLD AUTO: 0 X10E3/UL (ref 0–0.1)
IMM GRANULOCYTES NFR BLD AUTO: 0 %
LYMPHOCYTES # BLD AUTO: 4.5 X10E3/UL (ref 0.7–3.1)
LYMPHOCYTES NFR BLD AUTO: 55 %
MCH RBC QN AUTO: 26.5 PG (ref 26.6–33)
MCHC RBC AUTO-ENTMCNC: 31.8 G/DL (ref 31.5–35.7)
MCV RBC AUTO: 83 FL (ref 79–97)
MONOCYTES # BLD AUTO: 0.6 X10E3/UL (ref 0.1–0.9)
MONOCYTES NFR BLD AUTO: 7 %
NEUTROPHILS # BLD AUTO: 2.9 X10E3/UL (ref 1.4–7)
NEUTROPHILS NFR BLD AUTO: 36 %
PLATELET # BLD AUTO: 415 X10E3/UL (ref 150–450)
RBC # BLD AUTO: 5.05 X10E6/UL (ref 3.77–5.28)
T4 FREE SERPL-MCNC: 0.97 NG/DL (ref 0.82–1.77)
TSH SERPL DL<=0.005 MIU/L-ACNC: 4.58 UIU/ML (ref 0.45–4.5)
WBC # BLD AUTO: 8.1 X10E3/UL (ref 3.4–10.8)

## 2024-12-31 ENCOUNTER — TELEMEDICINE (OUTPATIENT)
Facility: CLINIC | Age: 29
End: 2024-12-31
Payer: MEDICAID

## 2024-12-31 DIAGNOSIS — F41.9 ANXIETY AND DEPRESSION: Primary | ICD-10-CM

## 2024-12-31 DIAGNOSIS — D72.820 LYMPHOCYTOSIS: ICD-10-CM

## 2024-12-31 DIAGNOSIS — F32.A ANXIETY AND DEPRESSION: Primary | ICD-10-CM

## 2024-12-31 DIAGNOSIS — R79.89 ELEVATED TSH: ICD-10-CM

## 2024-12-31 PROCEDURE — 99214 OFFICE O/P EST MOD 30 MIN: CPT | Performed by: NURSE PRACTITIONER

## 2024-12-31 RX ORDER — ESCITALOPRAM OXALATE 20 MG/1
20 TABLET ORAL DAILY
Qty: 90 TABLET | Refills: 1 | Status: SHIPPED | OUTPATIENT
Start: 2024-12-31

## 2024-12-31 NOTE — PROGRESS NOTES
Ludivina Martell is a 29 y.o. (1995) female is a Established patient, presents alone, who was seen by synchronous (real-time) audio-video technology on 12/31/2024 for evaluation of the following:   Chief Complaint   Patient presents with    Anxiety    Discuss Labs   History obtained from patient.    Assessment & Plan:     Assessment & Plan  1. Anxiety.  Her anxiety symptoms have shown improvement with the current treatment regimen, including brain spotting therapy. A prescription for a 90-day supply of Lexapro will be sent to her pharmacy at [facility].    2. Depression.  Her depression symptoms are also improving with the current treatment. She will continue with her current medication and therapy regimen.    3. Elevated lymphocytes.  Her lymphocyte count has slightly increased. A referral to an endocrinologist will be made to rule out autoimmune thyroiditis as a potential cause.    4. Environmental allergies.  She has been diagnosed with mild to moderate allergies to corn, milk, and various environmental factors. She has been using an air purifier and taking generic Xyzal as needed for hives, which has been effective. She will continue with these measures and consider allergy shots in the future when her schedule allows.    Follow-up  The patient will follow up in 3 months.  Anxiety and depression  Elevated TSH  -     Amb External Referral To Endocrinology  Lymphocytosis  -     Amb External Referral To Endocrinology    Return in about 3 months (around 3/31/2025) for anxiety/depression, telemedicine [facility].    Subjective:     History of Present Illness  The patient is a 29-year-old female who presents via virtual visit for a 2-month follow-up for anxiety and to discuss lab results.    She reports an improvement in her anxiety symptoms, although she continues to experience some difficulties. She has initiated brainspotting therapy, which she finds beneficial, as it enhances her motivation to study

## 2025-01-28 SDOH — HEALTH STABILITY: PHYSICAL HEALTH: ON AVERAGE, HOW MANY MINUTES DO YOU ENGAGE IN EXERCISE AT THIS LEVEL?: 20 MIN

## 2025-01-28 SDOH — HEALTH STABILITY: PHYSICAL HEALTH: ON AVERAGE, HOW MANY DAYS PER WEEK DO YOU ENGAGE IN MODERATE TO STRENUOUS EXERCISE (LIKE A BRISK WALK)?: 3 DAYS

## 2025-01-29 ENCOUNTER — OFFICE VISIT (OUTPATIENT)
Age: 30
End: 2025-01-29
Payer: MEDICAID

## 2025-01-29 VITALS — BODY MASS INDEX: 28.34 KG/M2 | WEIGHT: 160 LBS

## 2025-01-29 DIAGNOSIS — M99.03 SOMATIC DYSFUNCTION OF LUMBAR REGION: ICD-10-CM

## 2025-01-29 DIAGNOSIS — M99.04 SACRAL REGION SOMATIC DYSFUNCTION: ICD-10-CM

## 2025-01-29 DIAGNOSIS — M99.09 SOMATIC DYSFUNCTION OF ABDOMINAL REGION: ICD-10-CM

## 2025-01-29 DIAGNOSIS — M99.07 UPPER EXTREMITY SOMATIC DYSFUNCTION: ICD-10-CM

## 2025-01-29 DIAGNOSIS — M99.01 CERVICAL SOMATIC DYSFUNCTION: ICD-10-CM

## 2025-01-29 DIAGNOSIS — M99.02 THORACIC REGION SOMATIC DYSFUNCTION: ICD-10-CM

## 2025-01-29 DIAGNOSIS — M99.05 PELVIC REGION SOMATIC DYSFUNCTION: ICD-10-CM

## 2025-01-29 DIAGNOSIS — M99.06 LOWER LIMB REGION SOMATIC DYSFUNCTION: ICD-10-CM

## 2025-01-29 DIAGNOSIS — G57.01 PIRIFORMIS SYNDROME, RIGHT: ICD-10-CM

## 2025-01-29 DIAGNOSIS — M99.08 RIB CAGE REGION SOMATIC DYSFUNCTION: ICD-10-CM

## 2025-01-29 DIAGNOSIS — S76.019A: Primary | ICD-10-CM

## 2025-01-29 PROCEDURE — 98929 OSTEOPATH MANJ 9-10 REGIONS: CPT | Performed by: FAMILY MEDICINE

## 2025-01-29 PROCEDURE — 99204 OFFICE O/P NEW MOD 45 MIN: CPT | Performed by: FAMILY MEDICINE

## 2025-01-29 RX ORDER — NAPROXEN 500 MG/1
500 TABLET ORAL 2 TIMES DAILY WITH MEALS
Qty: 60 TABLET | Refills: 1 | Status: SHIPPED | OUTPATIENT
Start: 2025-01-29

## 2025-01-29 NOTE — PROGRESS NOTES
HISTORY OF PRESENT ILLNESS    Ludivina Martell 1995 is a 29 y.o. year old female comes in today as new patient for: low back pain right    Patients symptoms have been present for a month.  Pain level 3/10 lower right radiates down right leg. It has worsened with certain motions.  Patient has tried:  Zanaflex, Aleve with benefit.  It is described as pain in back/buttock after giving dog a bath and felt pull.    IMAGING: XR lumbar pending    No past surgical history on file.  Social History     Socioeconomic History    Marital status: Single     Spouse name: None    Number of children: None    Years of education: None    Highest education level: None   Tobacco Use    Smoking status: Never    Smokeless tobacco: Never   Vaping Use    Vaping status: Never Used   Substance and Sexual Activity    Alcohol use: Yes    Drug use: Not Currently     Social Determinants of Health     Financial Resource Strain: Low Risk  (7/31/2024)    Overall Financial Resource Strain (CARDIA)     Difficulty of Paying Living Expenses: Not hard at all   Food Insecurity: No Food Insecurity (7/31/2024)    Hunger Vital Sign     Worried About Running Out of Food in the Last Year: Never true     Ran Out of Food in the Last Year: Never true   Transportation Needs: No Transportation Needs (7/31/2024)    PRAPARE - Transportation     Lack of Transportation (Medical): No     Lack of Transportation (Non-Medical): No   Physical Activity: Insufficiently Active (1/28/2025)    Exercise Vital Sign     Days of Exercise per Week: 3 days     Minutes of Exercise per Session: 20 min   Housing Stability: Unknown (7/31/2024)    Housing Stability Vital Sign     Unable to Pay for Housing in the Last Year: No     Unstable Housing in the Last Year: No      Current Outpatient Medications   Medication Sig Dispense Refill    escitalopram (LEXAPRO) 20 MG tablet Take 1 tablet by mouth daily 90 tablet 1    Norgestim-Eth Estrad Triphasic (TRI-LO-CHASTITY) 0.18/0.215/0.25 MG-25 MCG

## 2025-01-29 NOTE — PATIENT INSTRUCTIONS
Either scan this QR code on your smartphone:        Or search YouTube for my channel:    Dr. EDUARDO Hartley    Low back/[facility]

## 2025-02-25 DIAGNOSIS — D72.820 LYMPHOCYTOSIS: Primary | ICD-10-CM

## 2025-02-26 ENCOUNTER — HOSPITAL ENCOUNTER (OUTPATIENT)
Facility: HOSPITAL | Age: 30
Discharge: HOME | End: 2025-03-01

## 2025-02-26 ENCOUNTER — OFFICE VISIT (OUTPATIENT)
Age: 30
End: 2025-02-26

## 2025-02-26 VITALS — WEIGHT: 161 LBS | BODY MASS INDEX: 28.52 KG/M2

## 2025-02-26 DIAGNOSIS — M99.03 SOMATIC DYSFUNCTION OF LUMBAR REGION: ICD-10-CM

## 2025-02-26 DIAGNOSIS — M99.05 PELVIC REGION SOMATIC DYSFUNCTION: ICD-10-CM

## 2025-02-26 DIAGNOSIS — M99.09 SOMATIC DYSFUNCTION OF ABDOMINAL REGION: ICD-10-CM

## 2025-02-26 DIAGNOSIS — M99.06 LOWER LIMB REGION SOMATIC DYSFUNCTION: ICD-10-CM

## 2025-02-26 DIAGNOSIS — M99.01 CERVICAL SOMATIC DYSFUNCTION: ICD-10-CM

## 2025-02-26 DIAGNOSIS — S76.011D SPRAIN, GLUTEUS MEDIUS, RIGHT, SUBSEQUENT ENCOUNTER: ICD-10-CM

## 2025-02-26 DIAGNOSIS — G57.01 PIRIFORMIS SYNDROME, RIGHT: ICD-10-CM

## 2025-02-26 DIAGNOSIS — S76.011D SPRAIN, GLUTEUS MEDIUS, RIGHT, SUBSEQUENT ENCOUNTER: Primary | ICD-10-CM

## 2025-02-26 DIAGNOSIS — M99.02 THORACIC REGION SOMATIC DYSFUNCTION: ICD-10-CM

## 2025-02-26 DIAGNOSIS — M99.04 SACRAL REGION SOMATIC DYSFUNCTION: ICD-10-CM

## 2025-02-26 DIAGNOSIS — M99.07 UPPER EXTREMITY SOMATIC DYSFUNCTION: ICD-10-CM

## 2025-02-26 DIAGNOSIS — M99.08 RIB CAGE REGION SOMATIC DYSFUNCTION: ICD-10-CM

## 2025-02-26 NOTE — PROGRESS NOTES
HISTORY OF PRESENT ILLNESS    Ludivina Martell 1995 is a 30 y.o. year old female comes in today to be evaluated and treated for: low back pain right    Since last appt 1/29/2025 has noticed Sx improved some with HEP. Pain level 2/10. Using Naproxen 500mg with benefit bu tno more Zanaflex.    IMAGING: XR lumbar pending    No past surgical history on file.  Social History     Socioeconomic History    Marital status: Single     Spouse name: None    Number of children: None    Years of education: None    Highest education level: None   Tobacco Use    Smoking status: Never    Smokeless tobacco: Never   Vaping Use    Vaping status: Never Used   Substance and Sexual Activity    Alcohol use: Yes    Drug use: Not Currently     Social Determinants of Health     Financial Resource Strain: Low Risk  (7/31/2024)    Overall Financial Resource Strain (CARDIA)     Difficulty of Paying Living Expenses: Not hard at all   Food Insecurity: No Food Insecurity (7/31/2024)    Hunger Vital Sign     Worried About Running Out of Food in the Last Year: Never true     Ran Out of Food in the Last Year: Never true   Transportation Needs: No Transportation Needs (7/31/2024)    PRAPARE - Transportation     Lack of Transportation (Medical): No     Lack of Transportation (Non-Medical): No   Physical Activity: Insufficiently Active (1/28/2025)    Exercise Vital Sign     Days of Exercise per Week: 3 days     Minutes of Exercise per Session: 20 min   Housing Stability: Unknown (7/31/2024)    Housing Stability Vital Sign     Unable to Pay for Housing in the Last Year: No     Unstable Housing in the Last Year: No     Current Outpatient Medications   Medication Sig Dispense Refill    naproxen (NAPROSYN) 500 MG tablet Take 1 tablet by mouth 2 times daily (with meals) 60 tablet 1    escitalopram (LEXAPRO) 20 MG tablet Take 1 tablet by mouth daily 90 tablet 1    Norgestim-Eth Estrad Triphasic (TRI-LO-CHASTITY) 0.18/0.215/0.25 MG-25 MCG TABS Take 1 tablet by

## 2025-02-26 NOTE — PATIENT INSTRUCTIONS
Either scan this QR code on your smartphone:        Or search YouTube for my channel:    Dr. EDUARDO Hartley    Rotator cuff    Hip Stretches

## 2025-03-30 SDOH — ECONOMIC STABILITY: INCOME INSECURITY: IN THE LAST 12 MONTHS, WAS THERE A TIME WHEN YOU WERE NOT ABLE TO PAY THE MORTGAGE OR RENT ON TIME?: NO

## 2025-03-30 SDOH — ECONOMIC STABILITY: FOOD INSECURITY: WITHIN THE PAST 12 MONTHS, YOU WORRIED THAT YOUR FOOD WOULD RUN OUT BEFORE YOU GOT MONEY TO BUY MORE.: NEVER TRUE

## 2025-03-30 SDOH — ECONOMIC STABILITY: TRANSPORTATION INSECURITY
IN THE PAST 12 MONTHS, HAS THE LACK OF TRANSPORTATION KEPT YOU FROM MEDICAL APPOINTMENTS OR FROM GETTING MEDICATIONS?: NO

## 2025-03-30 SDOH — ECONOMIC STABILITY: FOOD INSECURITY: WITHIN THE PAST 12 MONTHS, THE FOOD YOU BOUGHT JUST DIDN'T LAST AND YOU DIDN'T HAVE MONEY TO GET MORE.: NEVER TRUE

## 2025-03-30 SDOH — ECONOMIC STABILITY: TRANSPORTATION INSECURITY
IN THE PAST 12 MONTHS, HAS LACK OF TRANSPORTATION KEPT YOU FROM MEETINGS, WORK, OR FROM GETTING THINGS NEEDED FOR DAILY LIVING?: NO

## 2025-03-31 ENCOUNTER — TELEMEDICINE (OUTPATIENT)
Facility: CLINIC | Age: 30
End: 2025-03-31
Payer: MEDICAID

## 2025-03-31 DIAGNOSIS — F32.A ANXIETY AND DEPRESSION: Primary | ICD-10-CM

## 2025-03-31 DIAGNOSIS — F41.9 ANXIETY AND DEPRESSION: Primary | ICD-10-CM

## 2025-03-31 PROCEDURE — 99214 OFFICE O/P EST MOD 30 MIN: CPT | Performed by: NURSE PRACTITIONER

## 2025-03-31 RX ORDER — TRIAMCINOLONE ACETONIDE 5 MG/G
CREAM TOPICAL 2 TIMES DAILY
Qty: 60 G | Refills: 5 | Status: SHIPPED | OUTPATIENT
Start: 2025-03-31

## 2025-03-31 NOTE — PROGRESS NOTES
Ludivina Martell is a 30 y.o. (1995) female is a Established patient, presents alone, who was seen by synchronous (real-time) audio-video technology on 3/31/2025 for evaluation of the following:   Chief Complaint   Patient presents with    Anxiety    Depression   History obtained from patient.    Assessment & Plan:     Assessment & Plan  1. Health maintenance.  - Follow-up with hematologist earlier this month showed normal labs, including lymphocytes.  - Scheduled to follow up with hematologist in one year.  - Discussed scheduling next physical exam in October.  - Plan to check in one more time before returning to school in July.    2. Anxiety.  - Reports significant improvement in anxiety symptoms and has resumed studying.  - Lexapro is working well; BuSpar is taken as needed.  - Advised to take propranolol 1 to 2 tablets the night before and the morning of her exam if needed.    3. Depression.  - Reports feeling better and no longer scared to open her laptop.  - Lexapro is working well.  - Plan to provide a note for school clearance upon her request.  - Scheduled follow-up on 07/01/2025 before returning to school.    4. Eczema.  - Reports eczema is acting up with the change in season.  - Prescription for triamcinolone will be sent to her pharmacy.  - Discussed refilling triamcinolone due to spring season exacerbation.  - No refill needed for propranolol at this time; patient will contact if needed.    5. Allergic rhinitis.  - Started allergy shots, which have significantly reduced hives.  - Continues with allergy shots; reports improvement in symptoms.  - Initially tried antihistamines but found them insufficient.  - Allergy shots are administered weekly, with reduced severity of hives.    Anxiety and depression      Return in about 3 months (around 6/30/2025) for anxiety/depression/release to return school, telemedicine MyChart.    Subjective:     History of Present Illness  The patient presents for a 3-month

## 2025-04-30 ENCOUNTER — PATIENT MESSAGE (OUTPATIENT)
Facility: CLINIC | Age: 30
End: 2025-04-30

## 2025-06-17 RX ORDER — PROPRANOLOL HYDROCHLORIDE 10 MG/1
TABLET ORAL
Qty: 60 TABLET | Refills: 3 | Status: CANCELLED | OUTPATIENT
Start: 2025-06-17

## 2025-06-17 NOTE — TELEPHONE ENCOUNTER
Last Appointment- 3/31/25    Next Appointment- None    Previous Refill Encounter(s): Date: 2/1/24 #60 Refills 3    Requested Prescriptions     Pending Prescriptions Disp Refills    propranolol (INDERAL) 10 MG tablet [Pharmacy Med Name: PROPRANOLOL 10 MG TABLET] 60 tablet 3     Sig: TAKE 1-2 TABS BY MOUTH AT BEDTIME BEFORE EXAM, MAY REPEAT 1-2 TAB MORNING OF EXAM

## 2025-06-19 RX ORDER — PROPRANOLOL HYDROCHLORIDE 10 MG/1
TABLET ORAL
Qty: 60 TABLET | Refills: 3 | Status: SHIPPED | OUTPATIENT
Start: 2025-06-19

## 2025-06-25 ENCOUNTER — CLINICAL SUPPORT (OUTPATIENT)
Facility: CLINIC | Age: 30
End: 2025-06-25

## 2025-06-25 DIAGNOSIS — Z11.1 PPD SCREENING TEST: Primary | ICD-10-CM

## 2025-06-27 ENCOUNTER — CLINICAL SUPPORT (OUTPATIENT)
Facility: CLINIC | Age: 30
End: 2025-06-27

## 2025-06-27 DIAGNOSIS — Z11.1 PPD NEGATIVE: Primary | ICD-10-CM

## 2025-08-05 RX ORDER — ESCITALOPRAM OXALATE 20 MG/1
20 TABLET ORAL DAILY
Qty: 90 TABLET | Refills: 1 | Status: SHIPPED | OUTPATIENT
Start: 2025-08-05

## 2025-08-12 ENCOUNTER — PATIENT MESSAGE (OUTPATIENT)
Facility: CLINIC | Age: 30
End: 2025-08-12

## 2025-08-12 DIAGNOSIS — Z11.1 SCREENING EXAMINATION FOR PULMONARY TUBERCULOSIS: Primary | ICD-10-CM

## 2025-08-25 LAB
GAMMA INTERFERON BACKGROUND BLD IA-ACNC: 0.03 IU/ML
M TB IFN-G BLD-IMP: ABNORMAL
M TB IFN-G CD4+ BCKGRND COR BLD-ACNC: 0.03 IU/ML
M TB IFN-G CD4+CD8+ BCKGRND COR BLD-ACNC: 0.03 IU/ML
MITOGEN IGNF BCKGRD COR BLD-ACNC: 0.43 IU/ML
SERVICE CMNT-IMP: ABNORMAL

## 2025-08-27 ENCOUNTER — PATIENT MESSAGE (OUTPATIENT)
Facility: CLINIC | Age: 30
End: 2025-08-27

## 2025-08-28 DIAGNOSIS — Z11.1 SCREENING EXAMINATION FOR PULMONARY TUBERCULOSIS: Primary | ICD-10-CM

## 2025-09-03 LAB
GAMMA INTERFERON BACKGROUND BLD IA-ACNC: 0.15 IU/ML
M TB IFN-G BLD-IMP: NEGATIVE
M TB IFN-G CD4+ BCKGRND COR BLD-ACNC: 0.19 IU/ML
M TB IFN-G CD4+CD8+ BCKGRND COR BLD-ACNC: 0.17 IU/ML
MITOGEN IGNF BCKGRD COR BLD-ACNC: >10 IU/ML
QUANTIFERON, INCUBATION: NORMAL
SERVICE CMNT-IMP: NORMAL